# Patient Record
Sex: MALE | Race: WHITE | NOT HISPANIC OR LATINO | URBAN - METROPOLITAN AREA
[De-identification: names, ages, dates, MRNs, and addresses within clinical notes are randomized per-mention and may not be internally consistent; named-entity substitution may affect disease eponyms.]

---

## 2019-11-14 ENCOUNTER — HOSPITAL ENCOUNTER (EMERGENCY)
Facility: HOSPITAL | Age: 39
End: 2019-11-14
Attending: EMERGENCY MEDICINE | Admitting: EMERGENCY MEDICINE

## 2019-11-14 ENCOUNTER — HOSPITAL ENCOUNTER (INPATIENT)
Facility: HOSPITAL | Age: 39
LOS: 5 days | Discharge: HOME | DRG: 885 | End: 2019-11-19
Attending: PSYCHIATRY & NEUROLOGY | Admitting: PSYCHIATRY & NEUROLOGY
Payer: COMMERCIAL

## 2019-11-14 VITALS
HEART RATE: 88 BPM | WEIGHT: 200 LBS | OXYGEN SATURATION: 95 % | RESPIRATION RATE: 16 BRPM | HEIGHT: 72 IN | BODY MASS INDEX: 27.09 KG/M2 | SYSTOLIC BLOOD PRESSURE: 132 MMHG | DIASTOLIC BLOOD PRESSURE: 69 MMHG

## 2019-11-14 DIAGNOSIS — F43.12 POST-TRAUMATIC STRESS DISORDER, CHRONIC: Chronic | ICD-10-CM

## 2019-11-14 DIAGNOSIS — F10.10 ALCOHOL ABUSE: ICD-10-CM

## 2019-11-14 DIAGNOSIS — F32.A DEPRESSION WITH SUICIDAL IDEATION: Primary | ICD-10-CM

## 2019-11-14 DIAGNOSIS — R45.851 DEPRESSION WITH SUICIDAL IDEATION: Primary | ICD-10-CM

## 2019-11-14 DIAGNOSIS — R45.850 HOMICIDAL IDEATIONS: ICD-10-CM

## 2019-11-14 DIAGNOSIS — F39 MOOD DISORDER (HCC): Primary | ICD-10-CM

## 2019-11-14 DIAGNOSIS — F10.94 ALCOHOL USE WITH ALCOHOL-INDUCED MOOD DISORDER (HCC): ICD-10-CM

## 2019-11-14 LAB
AMPHETAMINES SERPL QL SCN: NEGATIVE
BACTERIA UR QL AUTO: ABNORMAL /HPF
BARBITURATES UR QL: NEGATIVE
BENZODIAZ UR QL: NEGATIVE
BILIRUB UR QL STRIP: NEGATIVE
CLARITY UR: CLEAR
COCAINE UR QL: NEGATIVE
COLOR UR: ABNORMAL
ETHANOL EXG-MCNC: 0.08 MG/DL
GLUCOSE UR STRIP-MCNC: NEGATIVE MG/DL
HGB UR QL STRIP.AUTO: 25
KETONES UR STRIP-MCNC: NEGATIVE MG/DL
LEUKOCYTE ESTERASE UR QL STRIP: 25
METHADONE UR QL: NEGATIVE
MUCOUS THREADS UR QL AUTO: ABNORMAL
NITRITE UR QL STRIP: NEGATIVE
NON-SQ EPI CELLS URNS QL MICRO: ABNORMAL /HPF
OPIATES UR QL SCN: NEGATIVE
PCP UR QL: NEGATIVE
PH UR STRIP.AUTO: 5 [PH]
PROT UR STRIP-MCNC: ABNORMAL MG/DL
RBC #/AREA URNS AUTO: ABNORMAL /HPF
SP GR UR STRIP.AUTO: 1.02 (ref 1–1.04)
THC UR QL: NEGATIVE
UROBILINOGEN UA: NEGATIVE MG/DL
WBC #/AREA URNS AUTO: ABNORMAL /HPF

## 2019-11-14 PROCEDURE — 82075 ASSAY OF BREATH ETHANOL: CPT

## 2019-11-14 PROCEDURE — 93005 ELECTROCARDIOGRAM TRACING: CPT

## 2019-11-14 PROCEDURE — 99285 EMERGENCY DEPT VISIT HI MDM: CPT

## 2019-11-14 PROCEDURE — 99285 EMERGENCY DEPT VISIT HI MDM: CPT | Performed by: EMERGENCY MEDICINE

## 2019-11-14 PROCEDURE — 81001 URINALYSIS AUTO W/SCOPE: CPT | Performed by: NURSE PRACTITIONER

## 2019-11-14 PROCEDURE — 80307 DRUG TEST PRSMV CHEM ANLYZR: CPT | Performed by: EMERGENCY MEDICINE

## 2019-11-14 PROCEDURE — 96372 THER/PROPH/DIAG INJ SC/IM: CPT

## 2019-11-14 RX ORDER — OLANZAPINE 10 MG/1
10 TABLET ORAL EVERY 6 HOURS PRN
Status: DISCONTINUED | OUTPATIENT
Start: 2019-11-14 | End: 2019-11-19 | Stop reason: HOSPADM

## 2019-11-14 RX ORDER — ACETAMINOPHEN 325 MG/1
650 TABLET ORAL EVERY 4 HOURS PRN
Status: CANCELLED | OUTPATIENT
Start: 2019-11-14

## 2019-11-14 RX ORDER — HALOPERIDOL 5 MG
5 TABLET ORAL EVERY 6 HOURS PRN
Status: DISCONTINUED | OUTPATIENT
Start: 2019-11-14 | End: 2019-11-19 | Stop reason: HOSPADM

## 2019-11-14 RX ORDER — HYDROXYZINE HYDROCHLORIDE 25 MG/1
25 TABLET, FILM COATED ORAL EVERY 6 HOURS PRN
Status: DISCONTINUED | OUTPATIENT
Start: 2019-11-14 | End: 2019-11-19 | Stop reason: HOSPADM

## 2019-11-14 RX ORDER — LORAZEPAM 2 MG/ML
1 INJECTION INTRAMUSCULAR EVERY 6 HOURS PRN
Status: DISCONTINUED | OUTPATIENT
Start: 2019-11-14 | End: 2019-11-19 | Stop reason: HOSPADM

## 2019-11-14 RX ORDER — QUETIAPINE FUMARATE 100 MG/1
100 TABLET, FILM COATED ORAL
Status: ON HOLD | COMMUNITY
End: 2019-11-19 | Stop reason: SDUPTHER

## 2019-11-14 RX ORDER — HALOPERIDOL 5 MG/ML
5 INJECTION INTRAMUSCULAR EVERY 6 HOURS PRN
Status: DISCONTINUED | OUTPATIENT
Start: 2019-11-14 | End: 2019-11-19 | Stop reason: HOSPADM

## 2019-11-14 RX ORDER — LORAZEPAM 0.5 MG/1
1 TABLET ORAL EVERY 6 HOURS PRN
Status: CANCELLED | OUTPATIENT
Start: 2019-11-14

## 2019-11-14 RX ORDER — RISPERIDONE 1 MG/1
2 TABLET, ORALLY DISINTEGRATING ORAL
Status: DISCONTINUED | OUTPATIENT
Start: 2019-11-14 | End: 2019-11-19 | Stop reason: HOSPADM

## 2019-11-14 RX ORDER — SERTRALINE HYDROCHLORIDE 100 MG/1
200 TABLET, FILM COATED ORAL DAILY
COMMUNITY
End: 2019-11-19 | Stop reason: HOSPADM

## 2019-11-14 RX ORDER — OLANZAPINE 10 MG/1
10 TABLET ORAL EVERY 6 HOURS PRN
Status: CANCELLED | OUTPATIENT
Start: 2019-11-14

## 2019-11-14 RX ORDER — ACETAMINOPHEN 325 MG/1
975 TABLET ORAL EVERY 6 HOURS PRN
Status: DISCONTINUED | OUTPATIENT
Start: 2019-11-14 | End: 2019-11-19 | Stop reason: HOSPADM

## 2019-11-14 RX ORDER — BENZTROPINE MESYLATE 1 MG/ML
1 INJECTION INTRAMUSCULAR; INTRAVENOUS EVERY 6 HOURS PRN
Status: CANCELLED | OUTPATIENT
Start: 2019-11-14

## 2019-11-14 RX ORDER — BENZTROPINE MESYLATE 1 MG/ML
1 INJECTION INTRAMUSCULAR; INTRAVENOUS EVERY 6 HOURS PRN
Status: DISCONTINUED | OUTPATIENT
Start: 2019-11-14 | End: 2019-11-19 | Stop reason: HOSPADM

## 2019-11-14 RX ORDER — ACETAMINOPHEN 325 MG/1
975 TABLET ORAL EVERY 6 HOURS PRN
Status: CANCELLED | OUTPATIENT
Start: 2019-11-14

## 2019-11-14 RX ORDER — BENZTROPINE MESYLATE 1 MG/1
1 TABLET ORAL EVERY 6 HOURS PRN
Status: DISCONTINUED | OUTPATIENT
Start: 2019-11-14 | End: 2019-11-19 | Stop reason: HOSPADM

## 2019-11-14 RX ORDER — HALOPERIDOL 5 MG/ML
10 INJECTION INTRAMUSCULAR ONCE
Status: COMPLETED | OUTPATIENT
Start: 2019-11-14 | End: 2019-11-14

## 2019-11-14 RX ORDER — ACETAMINOPHEN 325 MG/1
650 TABLET ORAL EVERY 6 HOURS PRN
Status: DISCONTINUED | OUTPATIENT
Start: 2019-11-14 | End: 2019-11-15

## 2019-11-14 RX ORDER — HALOPERIDOL 5 MG
5 TABLET ORAL EVERY 6 HOURS PRN
Status: CANCELLED | OUTPATIENT
Start: 2019-11-14

## 2019-11-14 RX ORDER — RISPERIDONE 1 MG/1
2 TABLET, ORALLY DISINTEGRATING ORAL
Status: CANCELLED | OUTPATIENT
Start: 2019-11-14

## 2019-11-14 RX ORDER — OLANZAPINE 10 MG/1
10 INJECTION, POWDER, LYOPHILIZED, FOR SOLUTION INTRAMUSCULAR EVERY 6 HOURS PRN
Status: CANCELLED | OUTPATIENT
Start: 2019-11-14

## 2019-11-14 RX ORDER — ACETAMINOPHEN 325 MG/1
650 TABLET ORAL EVERY 4 HOURS PRN
Status: DISCONTINUED | OUTPATIENT
Start: 2019-11-14 | End: 2019-11-15

## 2019-11-14 RX ORDER — MAGNESIUM HYDROXIDE/ALUMINUM HYDROXICE/SIMETHICONE 120; 1200; 1200 MG/30ML; MG/30ML; MG/30ML
30 SUSPENSION ORAL EVERY 4 HOURS PRN
Status: CANCELLED | OUTPATIENT
Start: 2019-11-14

## 2019-11-14 RX ORDER — HALOPERIDOL 5 MG/ML
5 INJECTION INTRAMUSCULAR EVERY 6 HOURS PRN
Status: CANCELLED | OUTPATIENT
Start: 2019-11-14

## 2019-11-14 RX ORDER — MAGNESIUM HYDROXIDE/ALUMINUM HYDROXICE/SIMETHICONE 120; 1200; 1200 MG/30ML; MG/30ML; MG/30ML
30 SUSPENSION ORAL EVERY 4 HOURS PRN
Status: DISCONTINUED | OUTPATIENT
Start: 2019-11-14 | End: 2019-11-19 | Stop reason: HOSPADM

## 2019-11-14 RX ORDER — ACETAMINOPHEN 325 MG/1
650 TABLET ORAL EVERY 6 HOURS PRN
Status: CANCELLED | OUTPATIENT
Start: 2019-11-14

## 2019-11-14 RX ORDER — OLANZAPINE 10 MG/1
10 INJECTION, POWDER, LYOPHILIZED, FOR SOLUTION INTRAMUSCULAR EVERY 6 HOURS PRN
Status: DISCONTINUED | OUTPATIENT
Start: 2019-11-14 | End: 2019-11-19 | Stop reason: HOSPADM

## 2019-11-14 RX ORDER — BENZTROPINE MESYLATE 1 MG/1
1 TABLET ORAL EVERY 6 HOURS PRN
Status: CANCELLED | OUTPATIENT
Start: 2019-11-14

## 2019-11-14 RX ORDER — TRAZODONE HYDROCHLORIDE 50 MG/1
50 TABLET ORAL
Status: CANCELLED | OUTPATIENT
Start: 2019-11-14

## 2019-11-14 RX ORDER — HYDROXYZINE HYDROCHLORIDE 25 MG/1
25 TABLET, FILM COATED ORAL EVERY 6 HOURS PRN
Status: CANCELLED | OUTPATIENT
Start: 2019-11-14

## 2019-11-14 RX ORDER — TRAZODONE HYDROCHLORIDE 50 MG/1
50 TABLET ORAL
Status: DISCONTINUED | OUTPATIENT
Start: 2019-11-14 | End: 2019-11-19 | Stop reason: HOSPADM

## 2019-11-14 RX ORDER — LORAZEPAM 2 MG/ML
1 INJECTION INTRAMUSCULAR EVERY 6 HOURS PRN
Status: CANCELLED | OUTPATIENT
Start: 2019-11-14

## 2019-11-14 RX ORDER — HALOPERIDOL 5 MG/ML
10 INJECTION INTRAMUSCULAR ONCE
Status: DISCONTINUED | OUTPATIENT
Start: 2019-11-14 | End: 2019-11-14

## 2019-11-14 RX ORDER — LORAZEPAM 1 MG/1
1 TABLET ORAL EVERY 6 HOURS PRN
Status: DISCONTINUED | OUTPATIENT
Start: 2019-11-14 | End: 2019-11-19 | Stop reason: HOSPADM

## 2019-11-14 RX ORDER — LORAZEPAM 2 MG/ML
2 INJECTION INTRAMUSCULAR ONCE
Status: COMPLETED | OUTPATIENT
Start: 2019-11-14 | End: 2019-11-14

## 2019-11-14 RX ADMIN — LORAZEPAM 2 MG: 2 INJECTION INTRAMUSCULAR; INTRAVENOUS at 03:45

## 2019-11-14 RX ADMIN — TRAZODONE HYDROCHLORIDE 50 MG: 50 TABLET ORAL at 21:50

## 2019-11-14 RX ADMIN — HALOPERIDOL LACTATE 10 MG: 5 INJECTION, SOLUTION INTRAMUSCULAR at 03:45

## 2019-11-14 RX ADMIN — NICOTINE POLACRILEX 2 MG: 2 GUM, CHEWING BUCCAL at 14:01

## 2019-11-14 NOTE — ED NOTES
Pts brother on phone  Pt does not want to talk but is okay with us giving the brother information about him being here and him being transferred        Munira Marcus  11/14/19 6080

## 2019-11-14 NOTE — PROGRESS NOTES
Pt admitted from [facility] ED as 201 to 3B  Pt states "I went [place] the casino, drank way too much, started having thoughts of harming myself, I thought about jumping off a bridge and called the police to prevent anything bad " Report confirms this, with the addition that pt was acting bizarre in casino, kicked EMS worker, and made numerous threats to ED staff warranting restraints to be applied  Was cooperative after sobering up  Presents as cooperative, constricted, flat and guarded  Content is scant and concrete  Pt denies SI, HI and hallucinations currently  Cousin Víctor Hicks is legal power of  and given access to pt's medical information (paperwork faxed to 3B)  Reported to nurse "He knows what to say to get a warm bed in the hospital since he was probably freezing outside and drunk  But he wasn't suicidal " Pt consents to sharing all info with Víctor Hicks  Pt has hx of drug use 5yrs ago as well as alcohol abuse hx; however was obtaining from alcohol for awhile before this event  Also has hx for PTSD, was  , and had previous hospitalizations for psych in [address] [place] [address] for similar events  Currently contracts for safety

## 2019-11-14 NOTE — ED ATTENDING ATTESTATION
11/14/2019  I, Gio Adan MD, saw and evaluated the patient  I have discussed the patient with the resident and agree with the resident's findings, Plan of Care, and MDM as documented in the resident's note, unless otherwise documented below  All available labs and Radiology studies were reviewed by myself  I was present for key portions of any procedure(s) performed by the resident and I was immediately available to provide assistance  I agree with the current assessment done in the Emergency Department  I have conducted an independent evaluation of this patient  Briefly, this is a 46 y o  male presenting with past medical history of depression and PTSD presenting to the emergency department via police department with suicidal and homicidal ideation  Patient is a former [employer] with history of PTSD and depression  This evening, he drank alcohol after getting worsening low moods  He had a plan to either kill himself by jumping off a bridge or killing someone else instilling nerve vehicle in killing himself  He asked a housemate to call the police, when police arrived, they called EMS as well  Patient was extremely combative necessitating restraints  On arrival, patient is awake, restrained by numerous police officers and EMS personnel, yelling profanities and threatening to kill the police officers  He is not redirectable  Following administration of Ativan and Haldol, patient is much more calm, reports worsening low moods and suicidal ideation  He drank alcohol this evening because he is depressed  He denies ingesting any other substances  He does not have any chest pain or shortness of breath  There is no abdominal pain, nausea, or vomiting  He does report a wound to his left arm after a fall, reports he has been applying a Band-Aid to the area      Physical Exam  Vitals:    11/14/19 0351 11/14/19 0633   BP: 148/85 109/59   Pulse: 105 95   Resp: 20 20   Patient Position - Orthostatic VS: Lying     Constitutional:  Awake, alert, screaming obscenities and not safe for examination  The rest of the examination is performed after patient received Ativan and Haldol  HEENT:  Normocephalic, atraumatic  Sclera anicteric, conjunctiva are injected  Moist oral mucosa  Cardiac:  Mildly tachycardic, regular, no murmurs or rubs  2+ radial pulses  Respiratory:  Lungs are clear to auscultation bilaterally, no wheezes or rales  Abdomen:  Nondistended  Bowel sounds present  No tenderness to palpation  No rebound or guarding  Extremities:  No deformities, no edema  There is a superficial wound to left upper arm dressed with a Band-Aid  There is no evidence of surrounding erythema  Integument:  No rashes or exposed areas, cap refill less than 2 seconds  Neurologic:  Awake, alert, and oriented x3  Nonfocal exam   Psychiatric:  Initially combative and verbally abusive, impulsive, shouting he would either kill himself or kill the first person he would come across  Following chemical restraint medications, patient is redirectable and polite, reports he would like to sign in voluntarily to get help for his worsening mood and suicidal ideation  Does not appear to attend to internal stimuli  ED Course  Medications   haloperidol lactate (HALDOL) injection 10 mg (10 mg Intramuscular Given 11/14/19 0345)   LORazepam (ATIVAN) 2 mg/mL injection 2 mg (2 mg Intramuscular Given 11/14/19 0345)       56-year-old  presenting with suicidal ideation and possibly homicidal ideation, currently with alcohol intoxication  Patient is initially extremely combative and verbally abusive, necessitating numerous members of staff to physically restrain the patient  Given significant concern for the well being of the patient and will being of those around him, chemical restraints were deemed necessary, Ativan 2 mg IM and Haldol 10 mg IM administered and patient placed in wrist and ankle restraints    On re-evaluation, patient is awake and alert, he is calm and cooperative  We are able to obtain history of present illness and performed a physical exam   Breathalyzer ETOH 0 084  On re-evaluation, patient is sleeping but arousable  He reports he is done being violent  We removed the restraints  Patient provided urine sample  UDS negative  On re-evaluation, patient is sleeping, wakes up appropriately, continues to be calm and cooperative  Patient care signed out to day team with plan for patient to be evaluated by ED Crisis Worker, to sign 201 form, and for patient to be admitted for further psychiatric evaluation and care        Clinical Impression  Final diagnoses:   Alcohol abuse   Depression with suicidal ideation   Homicidal ideations

## 2019-11-14 NOTE — ED NOTES
Pt comes to the ed from [facility] after drinking for the first time in two years  Pt states he became depressed and asked security to call for an ambulance  While in the ed pt made threats to jump off a bridge and kill several other people including police officers  Pt was placed in restraints  Pt is currently out of restraints and sober  Pt still feels depressed but not currently suicidal or having thoughts of hurting anyone else  Pt denies any hallucinations  Pt sees a psychiatrist in [facility] and claims to be med compliant  Has hx of PTSD and depression   Pt agrees he needs help and wishes to sign a 201 for inpatient tx

## 2019-11-14 NOTE — ED NOTES
[employer] Grey laptop  Portfolio (Riosisabel Mac leather)  [employer] notebook  Black umbrella   [place] hat  Black laptop   Black LG cell phone  [employer] Body spray  [employer] badge  Black wallet  [employer] watch  Black sunglasses  Green/black head phones  White bandana   [place] Cigarettes  Yellow lighter  Matches  Visine   base (white)  Water bottle  Dog tag  State ID  Veterans ID  [employer] Id  One [place]  11/14/19 8255

## 2019-11-14 NOTE — ED NOTES
Pt ambulated to the bathroom at 0648 and gave a urine sample  Patient was given a blanket and a pillow and is now sleeping again       [email or web address]  04/41/09 8751

## 2019-11-14 NOTE — LETTER
[address] [facility] EMERGENCY DEPARTMENT  [facility] [address]  Dept: [phone]      [facility]    NAME Mylene LUA 1980                              MRN [id number]    I have been informed of my rights regarding examination, treatment, and transfer   by Dr Noreen Dominguez MD    Benefits: Continuity of care    Risks: Potential for delay in receiving treatment      Consent for Transfer:  I acknowledge that my medical condition has been evaluated and explained to me by the emergency department physician or other qualified medical person and/or my attending physician, who has recommended that I be transferred to the service of  Accepting Physician: Zuleika at [address] Name, [address] : [facility]  The above potential benefits of such transfer, the potential risks associated with such transfer, and the probable risks of not being transferred have been explained to me, and I fully understand them  The doctor has explained that, in my case, the benefits of transfer outweigh the risks  I agree to be transferred  I authorize the performance of emergency medical procedures and treatments upon me in both transit and upon arrival at the receiving facility  Additionally, I authorize the release of any and all medical records to the receiving facility and request they be transported with me, if possible  I understand that the safest mode of transportation during a medical emergency is an ambulance and that the Hospital advocates the use of this mode of transport  Risks of traveling to the receiving facility by car, including absence of medical control, life sustaining equipment, such as oxygen, and medical personnel has been explained to me and I fully understand them  (LISSETTE CORRECT BOX BELOW)  [  ]  I consent to the stated transfer and to be transported by ambulance/helicopter    [  ]  I consent to the stated transfer, but refuse transportation by ambulance and accept full responsibility for my transportation by car  I understand the risks of non-ambulance transfers and I exonerate the Hospital and its staff from any deterioration in my condition that results from this refusal     X___________________________________________    DATE  19  TIME________  Signature of patient or legally responsible individual signing on patient behalf           RELATIONSHIP TO PATIENT_________________________                  Provider Certification    NAME Raudel Burgos                                         1980                              MRN [id number]    A medical screening exam was performed on the above named patient  Based on the examination:    Condition Necessitating Transfer The encounter diagnosis was Alcohol abuse  Patient Condition: The patient has been stabilized such that within reasonable medical probability, no material deterioration of the patient condition or the condition of the unborn child(rodney) is likely to result from the transfer    Reason for Transfer: Level of Care needed not available at this facility    Transfer Requirements: [address]   · Space available and qualified personnel available for treatment as acknowledged by    · Agreed to accept transfer and to provide appropriate medical treatment as acknowledged by       [facility]  · Appropriate medical records of the examination and treatment of the patient are provided at the time of transfer   [address], [address] _______  · Transfer will be performed by qualified personnel from [facility]  and appropriate transfer equipment as required, including the use of necessary and appropriate life support measures      Provider Certification: I have examined the patient and explained the following risks and benefits of being transferred/refusing transfer to the patient/family:  General risk, such as traffic hazards, adverse weather conditions, rough terrain or turbulence, possible failure of equipment (including vehicle or aircraft), or consequences of actions of persons outside the control of the transport personnel      Based on these reasonable risks and benefits to the patient and/or the unborn child(rodney), and based upon the information available at the time of the patients examination, I certify that the medical benefits reasonably to be expected from the provision of appropriate medical treatments at another medical facility outweigh the increasing risks, if any, to the individuals medical condition, and in the case of labor to the unborn child, from effecting the transfer      X____________________________________________ DATE 11/14/19        TIME_______      ORIGINAL - SEND TO MEDICAL RECORDS   COPY - SEND WITH PATIENT DURING TRANSFER

## 2019-11-14 NOTE — PLAN OF CARE
Problem: Risk for Self Injury/Neglect  Goal: Treatment Goal: Remain safe during length of stay, learn and adopt new coping skills, and be free of self-injurious ideation, impulses and acts at the time of discharge  Outcome: Not Progressing  Goal: Verbalize thoughts and feelings  Description  Interventions:  - Assess and re-assess patient's lethality and potential for self-injury  - Engage patient in 1:1 interactions, daily, for a minimum of 15 minutes  - Encourage patient to express feelings, fears, frustrations, hopes  - Establish rapport/trust with patient   Outcome: Not Progressing  Goal: Attend and participate in unit activities, including therapeutic, recreational, and educational groups  Description  Interventions:  - Provide therapeutic and educational activities daily, encourage attendance and participation, and document same in the medical record  - Obtain collateral information, encourage visitation and family involvement in care   Outcome: Not Progressing     Problem: SUBSTANCE USE/ABUSE  Goal: By discharge, will develop insight into their chemical dependency and sustain motivation to continue in recovery  Description  INTERVENTIONS:  - Attends all daily group sessions and scheduled AA groups  - Actively practices coping skills through participation in the therapeutic community and adherence to program rules  - Reviews and completes assignments from individual treatment plan  - Assist patient development of understanding of their personal cycle of addiction and relapse triggers  Outcome: Not Progressing  Goal: By discharge, patient will have ongoing treatment plan addressing chemical dependency  Description  INTERVENTIONS:  - Assist patient with resources and/or appointments for ongoing recovery based living  Outcome: Not Progressing     Problem: SAFETY, RESTRAINT - VIOLENT/SELF-DESTRUCTIVE  Goal: Returns to optimal restraint-free functioning  Description  INTERVENTIONS:  - Assess the patient's behavior and symptoms that indicate continued need for restraint  - Identify and implement measures to help patient regain control  - Assess readiness for release of restraint   Outcome: Not Progressing

## 2019-11-14 NOTE — ED NOTES
Lunch tray ordered for pt at this time  Pt resting comfortably in bed, denies any complaints        Munira Marcus  11/14/19 0351

## 2019-11-14 NOTE — ED NOTES
Patient is accepted at [facility]  Patient is accepted by TEREZA To per [address] is arranged with [place] Pamela is scheduled for 1500     Nurse report is to be called to [phone] prior to patient transfer

## 2019-11-14 NOTE — ED NOTES
Paper charted from [phone] [facility] was giving me "error loading" messages      Ry Campuzano  44/58/39 0996

## 2019-11-14 NOTE — ED NOTES
Pt gene is located in zone 6 [facility] room in cabinet LYNN Venegas [facility]  75/76/50 5438

## 2019-11-14 NOTE — ED NOTES
As per EVS pt is not on file   Pt states he doesn't know if he has insurance and there is nothing in media

## 2019-11-14 NOTE — ED PROVIDER NOTES
History  Chief Complaint   Patient presents with    Aggressive Behavior     Patient coming in with police and EMS; verbally aggressive and threatening to kick ED staff and EMS; found at [facility] yelling that he is going to either kill himself or someone else        History provided by:  EMS personnel and police   used: No      Patient is a 23-year-old male with past medical history of depression and PTSD who presents to the emergency department with suicidal and homicidal ideations  The patient states that he has been drinking alcohol this evening, and that he was either going to jump off of a bridge or he was going to kill someone and steal their vehicle  He told someone to call police and police subsequently came and evaluated the patient, EMS was called as well  Patient was combative, had to be physically restrained secondary to aggressive behavior  Patient has been yelling racial slurs and threatening to harm police officers EMS  On arrival he is yelling profanities, verbally abusive and aggressive  Patient restrained for safety of self and for safety of medical staff  Patient denies prior suicide attempts, he denies ingestions of any illicit drugs, over-the-counter medications, or prescription medications  He denies any physical complaints including chest pain, shortness of breath, cough, abdominal pain, nausea, vomiting, urinary symptoms, melena, hematochezia, headache, neck pain  Prior to Admission Medications   Prescriptions Last Dose Informant Patient Reported? Taking? QUEtiapine (SEROquel) 100 mg tablet Past Week at Unknown time  Yes Yes   Sig: Take 100 mg by mouth daily at bedtime   sertraline (ZOLOFT) 100 mg tablet 11/13/2019 at Unknown time  Yes Yes   Sig: Take 200 mg by mouth daily      Facility-Administered Medications: None       Past Medical History:   Diagnosis Date    Depression     PTSD (post-traumatic stress disorder)        History reviewed   No pertinent surgical history  History reviewed  No pertinent family history  I have reviewed and agree with the history as documented  Social History     Tobacco Use    Smoking status: Heavy Tobacco Smoker     Packs/day: 0 50     Types: Cigarettes    Smokeless tobacco: Never Used   Substance Use Topics    Alcohol use: Yes     Frequency: Monthly or less     Drinks per session: 1 or 2     Binge frequency: Never    Drug use: Never        Review of Systems   Unable to perform ROS: Psychiatric disorder   Psychiatric/Behavioral: Positive for agitation and suicidal ideas  Physical Exam  ED Triage Vitals   Temp Pulse Respirations Blood Pressure SpO2   -- 11/14/19 0351 11/14/19 0351 11/14/19 0351 11/14/19 0351    105 20 148/85 97 %      Temp src Heart Rate Source Patient Position - Orthostatic VS BP Location FiO2 (%)   -- 11/14/19 0351 11/14/19 0633 11/14/19 0633 --    Monitor Lying Left arm       Pain Score       11/14/19 1240       No Pain             Orthostatic Vital Signs  Vitals:    11/14/19 0351 11/14/19 0633 11/14/19 1240 11/14/19 1552   BP: 148/85 109/59 113/57 132/69   Pulse: 105 95 73 88   Patient Position - Orthostatic VS:  Lying Lying Lying       Physical Exam   Constitutional: He is oriented to person, place, and time  He appears well-developed and well-nourished  HENT:   Head: Normocephalic and atraumatic  Right Ear: External ear normal    Left Ear: External ear normal    Nose: Nose normal    Mouth/Throat: Oropharynx is clear and moist    Eyes: Conjunctivae and EOM are normal  No scleral icterus  Neck: Normal range of motion  No JVD present  No tracheal deviation present  Cardiovascular: Normal rate, regular rhythm, normal heart sounds and intact distal pulses  No murmur heard  Tachycardic   Pulmonary/Chest: Effort normal and breath sounds normal  No respiratory distress  Abdominal: Soft  Bowel sounds are normal  He exhibits no distension  There is no tenderness  There is no guarding  Musculoskeletal: Normal range of motion  He exhibits no edema  Neurological: He is alert and oriented to person, place, and time  He exhibits normal muscle tone  Skin: Skin is warm and dry  Capillary refill takes less than 2 seconds  He is not diaphoretic  Psychiatric: His speech is normal  His affect is angry and inappropriate  He is agitated, aggressive and combative  Cognition and memory are normal  He expresses impulsivity and inappropriate judgment  He expresses homicidal and suicidal ideation  He expresses suicidal plans and homicidal plans  Vitals reviewed  ED Medications  Medications   haloperidol lactate (HALDOL) injection 10 mg (10 mg Intramuscular Given 11/14/19 0345)   LORazepam (ATIVAN) 2 mg/mL injection 2 mg (2 mg Intramuscular Given 11/14/19 0345)   nicotine polacrilex (NICORETTE) gum 2 mg (2 mg Oral Given 11/14/19 1401)       Diagnostic Studies  Results Reviewed     Procedure Component Value Units Date/Time    POCT alcohol breath test [[id number]]  (Normal) Resulted:  11/14/19 0415    Lab Status:  Final result Updated:  11/14/19 0900     EXTBreath Alcohol 0 084    Rapid drug screen, urine [[id number]]  (Normal) Collected:  11/14/19 0648    Lab Status:  Final result Specimen:  Urine, Clean Catch Updated:  11/14/19 0732     Amph/Meth UR Negative     Barbiturate Ur Negative     Benzodiazepine Urine Negative     Cocaine Urine Negative     Methadone Urine Negative     Opiate Urine Negative     PCP Ur Negative     THC Urine Negative    Narrative:       FOR MEDICAL PURPOSES ONLY  IF CONFIRMATION NEEDED PLEASE CONTACT THE LAB WITHIN 5 DAYS      Drug Screen Cutoff Levels:  AMPHETAMINE/METHAMPHETAMINES  1000 ng/mL  BARBITURATES     200 ng/mL  BENZODIAZEPINES     200 ng/mL  COCAINE      300 ng/mL  METHADONE      300 ng/mL  OPIATES      300 ng/mL  PHENCYCLIDINE     25 ng/mL  THC       50 ng/mL                   No orders to display         Procedures  Procedures        ED Course MDM  Number of Diagnoses or Management Options  Diagnosis management comments: Patient with suicidal and homicidal ideations, with a plan  Patient refuses to sign 201, 302 to signed  Pending placement  Patient signed out to Dr Golden Hale          Amount and/or Complexity of Data Reviewed  Clinical lab tests: ordered and reviewed  Decide to obtain previous medical records or to obtain history from someone other than the patient: yes  Obtain history from someone other than the patient: yes        Disposition  Final diagnoses:   Alcohol abuse     Time reflects when diagnosis was documented in both MDM as applicable and the Disposition within this note     Time User Action Codes Description Comment    11/14/2019 11:45 AM Nicolasa Champagne 92 Add [F10 10] Alcohol abuse       ED Disposition     ED Disposition Condition Date/Time Comment    Transfer to Another Facility  Thu Nov 14, 2019  4:42 PM George Au should be transferred out to [facility] as per PAUL FERNANDEZ Documentation      Most Recent Value   Patient Condition  The patient has been stabilized such that within reasonable medical probability, no material deterioration of the patient condition or the condition of the unborn child(rodney) is likely to result from the transfer   Reason for Transfer  Level of Care needed not available at this facility   Benefits of Transfer  Continuity of care   Risks of Transfer  Potential for delay in receiving treatment   Accepting Physician  [facility] Name, Maira Nicholas by [place] and Unit #)  Candie Carvajal   Provider Certification  General risk, such as traffic hazards, adverse weather conditions, rough terrain or turbulence, possible failure of equipment (including vehicle or aircraft), or consequences of actions of persons outside the control of the transport personnel      RN Documentation      Most [address] [place] [address] Name, [address]   Report Given to  [employer]   Transported by [place] and Unit #)  [place]   Level of Care  Basic life support   Patient Belongings Disposition  Sent with patient      Follow-up Information    None         Discharge Medication List as of 11/14/2019  4:42 PM      CONTINUE these medications which have NOT CHANGED    Details   QUEtiapine (SEROquel) 100 mg tablet Take 100 mg by mouth daily at bedtime, Historical Med      sertraline (ZOLOFT) 100 mg tablet Take 200 mg by mouth daily, Historical Med           No discharge procedures on file  ED Provider  Attending physically available and evaluated Mathew Ana MOSES managed the patient along with the ED Attending      Electronically Signed by         Linette Jackson DO  11/14/19 5573

## 2019-11-14 NOTE — ED NOTES
Hard restraints removed at  by MD Klaus Ramsey and RENEA Mckenzie  60/94/73 Dawood Downey  42/61/56 8188

## 2019-11-15 ENCOUNTER — APPOINTMENT (INPATIENT)
Dept: ULTRASOUND IMAGING | Facility: HOSPITAL | Age: 39
DRG: 885 | End: 2019-11-15
Payer: COMMERCIAL

## 2019-11-15 PROBLEM — F33.2 SEVERE EPISODE OF RECURRENT MAJOR DEPRESSIVE DISORDER, WITHOUT PSYCHOTIC FEATURES (HCC): Status: ACTIVE | Noted: 2019-11-15

## 2019-11-15 PROBLEM — F33.2 MAJOR DEPRESSIVE DISORDER, RECURRENT, SEVERE WITHOUT PSYCHOTIC FEATURES (HCC): Chronic | Status: ACTIVE | Noted: 2019-11-15

## 2019-11-15 PROBLEM — E78.2 MIXED HYPERLIPIDEMIA: Status: ACTIVE | Noted: 2019-11-15

## 2019-11-15 PROBLEM — F43.12 POST-TRAUMATIC STRESS DISORDER, CHRONIC: Chronic | Status: ACTIVE | Noted: 2019-11-15

## 2019-11-15 PROBLEM — Z00.8 MEDICAL CLEARANCE FOR PSYCHIATRIC ADMISSION: Status: ACTIVE | Noted: 2019-11-15

## 2019-11-15 PROBLEM — Z72.0 TOBACCO ABUSE: Status: ACTIVE | Noted: 2019-11-15

## 2019-11-15 PROBLEM — R74.01 TRANSAMINITIS: Status: ACTIVE | Noted: 2019-11-15

## 2019-11-15 PROBLEM — F10.20 UNCOMPLICATED ALCOHOL DEPENDENCE (HCC): Chronic | Status: ACTIVE | Noted: 2019-11-15

## 2019-11-15 LAB
ALBUMIN SERPL BCP-MCNC: 4.3 G/DL (ref 3–5.2)
ALP SERPL-CCNC: 75 U/L (ref 43–122)
ALT SERPL W P-5'-P-CCNC: 95 U/L (ref 9–52)
ANION GAP SERPL CALCULATED.3IONS-SCNC: 7 MMOL/L (ref 5–14)
AST SERPL W P-5'-P-CCNC: 86 U/L (ref 17–59)
ATRIAL RATE: 83 BPM
BASOPHILS # BLD AUTO: 0 THOUSANDS/ΜL (ref 0–0.1)
BASOPHILS NFR BLD AUTO: 1 % (ref 0–1)
BILIRUB SERPL-MCNC: 0.7 MG/DL
BUN SERPL-MCNC: 13 MG/DL (ref 5–25)
CALCIUM SERPL-MCNC: 9.4 MG/DL (ref 8.4–10.2)
CHLORIDE SERPL-SCNC: 103 MMOL/L (ref 97–108)
CHOLEST SERPL-MCNC: 193 MG/DL
CO2 SERPL-SCNC: 28 MMOL/L (ref 22–30)
CREAT SERPL-MCNC: 0.84 MG/DL (ref 0.7–1.5)
EOSINOPHIL # BLD AUTO: 0.1 THOUSAND/ΜL (ref 0–0.4)
EOSINOPHIL NFR BLD AUTO: 2 % (ref 0–6)
ERYTHROCYTE [DISTWIDTH] IN BLOOD BY AUTOMATED COUNT: 13.4 %
GFR SERPL CREATININE-BSD FRML MDRD: 111 ML/MIN/1.73SQ M
GLUCOSE P FAST SERPL-MCNC: 96 MG/DL (ref 70–99)
GLUCOSE SERPL-MCNC: 96 MG/DL (ref 70–99)
HCT VFR BLD AUTO: 44.7 % (ref 41–53)
HDLC SERPL-MCNC: 36 MG/DL
HGB BLD-MCNC: 15 G/DL (ref 13.5–17.5)
LDLC SERPL CALC-MCNC: 136 MG/DL
LYMPHOCYTES # BLD AUTO: 2.1 THOUSANDS/ΜL (ref 0.5–4)
LYMPHOCYTES NFR BLD AUTO: 32 % (ref 25–45)
MCH RBC QN AUTO: 28.4 PG (ref 26–34)
MCHC RBC AUTO-ENTMCNC: 33.6 G/DL (ref 31–36)
MCV RBC AUTO: 84 FL (ref 80–100)
MONOCYTES # BLD AUTO: 0.6 THOUSAND/ΜL (ref 0.2–0.9)
MONOCYTES NFR BLD AUTO: 10 % (ref 1–10)
NEUTROPHILS # BLD AUTO: 3.6 THOUSANDS/ΜL (ref 1.8–7.8)
NEUTS SEG NFR BLD AUTO: 56 % (ref 45–65)
NONHDLC SERPL-MCNC: 157 MG/DL
P AXIS: 60 DEGREES
PLATELET # BLD AUTO: 326 THOUSANDS/UL (ref 150–450)
PMV BLD AUTO: 8.4 FL (ref 8.9–12.7)
POTASSIUM SERPL-SCNC: 4.3 MMOL/L (ref 3.6–5)
PR INTERVAL: 126 MS
PROT SERPL-MCNC: 8.3 G/DL (ref 5.9–8.4)
QRS AXIS: 67 DEGREES
QRSD INTERVAL: 88 MS
QT INTERVAL: 372 MS
QTC INTERVAL: 437 MS
RBC # BLD AUTO: 5.29 MILLION/UL (ref 4.5–5.9)
RPR SER QL: NORMAL
SODIUM SERPL-SCNC: 138 MMOL/L (ref 137–147)
T WAVE AXIS: 41 DEGREES
TRIGL SERPL-MCNC: 105 MG/DL
TSH SERPL DL<=0.05 MIU/L-ACNC: 0.96 UIU/ML (ref 0.47–4.68)
VENTRICULAR RATE: 83 BPM
WBC # BLD AUTO: 6.4 THOUSAND/UL (ref 4.5–11)

## 2019-11-15 PROCEDURE — 86592 SYPHILIS TEST NON-TREP QUAL: CPT | Performed by: NURSE PRACTITIONER

## 2019-11-15 PROCEDURE — 99253 IP/OBS CNSLTJ NEW/EST LOW 45: CPT | Performed by: FAMILY MEDICINE

## 2019-11-15 PROCEDURE — 76705 ECHO EXAM OF ABDOMEN: CPT

## 2019-11-15 PROCEDURE — 93010 ELECTROCARDIOGRAM REPORT: CPT | Performed by: INTERNAL MEDICINE

## 2019-11-15 PROCEDURE — 85025 COMPLETE CBC W/AUTO DIFF WBC: CPT | Performed by: NURSE PRACTITIONER

## 2019-11-15 PROCEDURE — 84443 ASSAY THYROID STIM HORMONE: CPT | Performed by: NURSE PRACTITIONER

## 2019-11-15 PROCEDURE — 82652 VIT D 1 25-DIHYDROXY: CPT | Performed by: NURSE PRACTITIONER

## 2019-11-15 PROCEDURE — 99222 1ST HOSP IP/OBS MODERATE 55: CPT | Performed by: PSYCHIATRY & NEUROLOGY

## 2019-11-15 PROCEDURE — 80053 COMPREHEN METABOLIC PANEL: CPT | Performed by: NURSE PRACTITIONER

## 2019-11-15 PROCEDURE — 80061 LIPID PANEL: CPT | Performed by: NURSE PRACTITIONER

## 2019-11-15 RX ORDER — QUETIAPINE FUMARATE 50 MG/1
50 TABLET, FILM COATED ORAL
Status: DISCONTINUED | OUTPATIENT
Start: 2019-11-15 | End: 2019-11-16

## 2019-11-15 RX ORDER — THIAMINE MONONITRATE (VIT B1) 100 MG
100 TABLET ORAL DAILY
Status: DISCONTINUED | OUTPATIENT
Start: 2019-11-16 | End: 2019-11-19 | Stop reason: HOSPADM

## 2019-11-15 RX ORDER — NICOTINE 21 MG/24HR
1 PATCH, TRANSDERMAL 24 HOURS TRANSDERMAL DAILY
Status: DISCONTINUED | OUTPATIENT
Start: 2019-11-15 | End: 2019-11-19 | Stop reason: HOSPADM

## 2019-11-15 RX ORDER — FOLIC ACID 1 MG/1
1 TABLET ORAL DAILY
Status: DISCONTINUED | OUTPATIENT
Start: 2019-11-16 | End: 2019-11-19 | Stop reason: HOSPADM

## 2019-11-15 RX ORDER — GABAPENTIN 300 MG/1
300 CAPSULE ORAL 3 TIMES DAILY
Status: DISCONTINUED | OUTPATIENT
Start: 2019-11-15 | End: 2019-11-17

## 2019-11-15 RX ORDER — GABAPENTIN 100 MG/1
100 CAPSULE ORAL 3 TIMES DAILY
Status: DISCONTINUED | OUTPATIENT
Start: 2019-11-19 | End: 2019-11-17

## 2019-11-15 RX ADMIN — QUETIAPINE FUMARATE 50 MG: 50 TABLET ORAL at 21:19

## 2019-11-15 RX ADMIN — NICOTINE 1 PATCH: 21 PATCH, EXTENDED RELEASE TRANSDERMAL at 10:54

## 2019-11-15 RX ADMIN — SERTRALINE HYDROCHLORIDE 200 MG: 50 TABLET ORAL at 10:59

## 2019-11-15 RX ADMIN — GABAPENTIN 300 MG: 300 CAPSULE ORAL at 21:19

## 2019-11-15 RX ADMIN — GABAPENTIN 300 MG: 300 CAPSULE ORAL at 14:28

## 2019-11-15 NOTE — ASSESSMENT & PLAN NOTE
Patient noted to have transaminitis  He denies any alcohol abuse to me however there is history of occasional alcohol use in his history  Will recheck CMP tomorrow and also check acute hepatitis panel and do an ultrasound of the liver to rule out any masses  Follow-up CMP in 4 weeks to be followed up by outpatient PCP    Avoid any hepatotoxic medications including Tylenol

## 2019-11-15 NOTE — CASE MANAGEMENT
Pt presented to [facility] accompanied by police and EMS as they were called by an unknown source  Pt had been drinking at the [facility] for the "first time in 2 years " Pt reported SI, HI and had stated that he had been binge drinking alcohol that evening and that he was either going to jump off a bridge or he was going to kill someone and steal their vehicle  Pt then told someone to call police and the police subsequently came and evaluated the patient along with EMS  Patient was combative, threatening, verbally and physically aggressive in the ED and required 4 point locked restraints  Pt had been yelling racial slurs and threatening to harm police officers, hospital staff, and EMS  Pt appeared to calm as he sobered from alcohol and wanted voluntary treatment as he is currently a 201  Pt states he has a psychiatrist in [place] at the [facility] named Dr Estefanía Velez and has a mental health and substance abuse history for ETOH and illicit drugs  Pt states he is a   of the [facility] [employer] for 6 years of service and has had previous psychiatric admissions for similar behaviors and issues in the past  Pt had signed an FAROOQ for his POA and cousin Víctor Hicks [phone]  Pt states there are no guns in the home where he lives with his cousin and his cousin's mother  Pt states they all get along well and it is a good living situation  Pt states he had one prior inpt psychiatric admit and 2 inpt rehab admits  Pt states that alcohol is the substance he is most prone to use and he is open to outpt addictions counseling, he does not want inpt rehab  Pt did use AA before and liked a sponsor but did not like the religous aspect of the program  Pt states he was only seeing his psychiatrist once every 3 months but states he will see her more often now and is considering that he may be open to seeing a therapist again monthly   Pt does receive SSI/SSD of 5,000 00 monthly and also has a good support system  Pt states his closest support is her cousin and caregiver Azael Fernandez  Pt states he will stay away from [facility], he admits that when he is drunk, he tends to be violent and aggressive  Pt does not drive, his cousin drives him to wherever he needs to go including appointments  Pt is hopeful for discharge early next week

## 2019-11-15 NOTE — PLAN OF CARE
Problem: Risk for Self Injury/Neglect  Goal: Treatment Goal: Remain safe during length of stay, learn and adopt new coping skills, and be free of self-injurious ideation, impulses and acts at the time of discharge  Outcome: Progressing  Goal: Verbalize thoughts and feelings  Description  Interventions:  - Assess and re-assess patient's lethality and potential for self-injury  - Engage patient in 1:1 interactions, daily, for a minimum of 15 minutes  - Encourage patient to express feelings, fears, frustrations, hopes  - Establish rapport/trust with patient   Outcome: Progressing  Goal: Attend and participate in unit activities, including therapeutic, recreational, and educational groups  Description  Interventions:  - Provide therapeutic and educational activities daily, encourage attendance and participation, and document same in the medical record  - Obtain collateral information, encourage visitation and family involvement in care   Outcome: Progressing     Problem: SUBSTANCE USE/ABUSE  Goal: By discharge, will develop insight into their chemical dependency and sustain motivation to continue in recovery  Description  INTERVENTIONS:  - Attends all daily group sessions and scheduled AA groups  - Actively practices coping skills through participation in the therapeutic community and adherence to program rules  - Reviews and completes assignments from individual treatment plan  - Assist patient development of understanding of their personal cycle of addiction and relapse triggers  Outcome: Progressing  Goal: By discharge, patient will have ongoing treatment plan addressing chemical dependency  Description  INTERVENTIONS:  - Assist patient with resources and/or appointments for ongoing recovery based living  Outcome: Progressing     Problem: SAFETY, RESTRAINT - VIOLENT/SELF-DESTRUCTIVE  Goal: Remains free of harm/injury from restraints (Restraint for Violent/Self-Destructive Behavior)  Description  INTERVENTIONS:  - Instruct patient/family regarding restraint use   - Assess and monitor physiologic and psychological status   - Provide interventions and comfort measures to meet assessed patient needs   - Ensure continuous in person monitoring is provided   - Identify and implement measures to help patient regain control  - Assess readiness for release of restraint  Outcome: Progressing  Goal: Returns to optimal restraint-free functioning  Description  INTERVENTIONS:  - Assess the patient's behavior and symptoms that indicate continued need for restraint  - Identify and implement measures to help patient regain control  - Assess readiness for release of restraint   Outcome: Progressing

## 2019-11-15 NOTE — PROGRESS NOTES
Pt attended tx team mtg  Reviewed tx plan  Pt signed plan  Pt from [place]  Said he went to the [facility] in [place], [place], for his tx  1st psych admit

## 2019-11-15 NOTE — CASE MANAGEMENT
Pt said that he never was suicidal  Odilon Nolas[place], didn't have a way home, had nowhere to go, & it was cold outside  Odilon Nolasco he was NEVER suicidal  But said he was suicidal to come to the hospital  Odilon Nolasco he lived with a caretaker, who helped him  Said he did not drive  Odilon Gaurav he was a   SW asked where he went for tx  Pt said to the [facility] in [place], [place]  Said he did not want a therapist  Does not like therapy  Signed FAROOQ for [employer]

## 2019-11-15 NOTE — PROGRESS NOTES
11/15/19 0700   Team Meeting   Meeting Type Daily Rounds   Initial Conference Date 11/15/19   Team Members Present   Team Members Present Physician;Nurse;; Other (Discipline and Name)   Nursing Team Member [place] Work Team Member Paul Cline   Other (Discipline and Name) Dr Yesi Reyna (Resident), Keli Milan   Patient/Family Present   Patient Present No   Patient's Family Present No     Start medications

## 2019-11-15 NOTE — ASSESSMENT & PLAN NOTE
Patient noted to have elevated cholesterol however only recommend diet exercise at this time and no medications

## 2019-11-15 NOTE — PROGRESS NOTES
KASPER GROUP NOTE  Participated in group activities  Left prior to group discussion  Minimal interaction with group facilitator or peers  11/15/19 1000   Activity/Group Checklist   Group Personal control  (mindfulness)   Attendance Attended   Attendance Duration (min) 16-30   Interactions Interacted appropriately   Affect/Mood Appropriate   Goals Achieved Able to listen to others; Able to engage in interactions

## 2019-11-15 NOTE — CONSULTS
Consult- Daryle Cotton 1980, 46 y o  male MRN: [id number]    Unit/Bed#: Dee Lugo 002-26 Encounter: [id number]    Primary Care Provider: No primary care provider on file  Date and time admitted to hospital: 11/14/2019  5:14 PM      Inpatient consult for Medical Clearance for [facility] patient  Consult performed by: Patti Wells MD  Consult ordered by: Twyla Litten, CRNP          Medical clearance for psychiatric admission  Assessment & Plan  Reviewed metabolic profile and EKG  Patient is cleared for inpatient behavioral health treatment  Avoid any hepatotoxic medication    Mixed hyperlipidemia  Assessment & Plan  Patient noted to have elevated cholesterol however only recommend diet exercise at this time and no medications  Tobacco abuse  Assessment & Plan  Counseled on smoking cessation and placed on nicotine replacement therapy    Transaminitis  Assessment & Plan  Patient noted to have transaminitis  He denies any alcohol abuse to me however there is history of occasional alcohol use in his history  Will recheck CMP tomorrow and also check acute hepatitis panel and do an ultrasound of the liver to rule out any masses  Follow-up CMP in 4 weeks to be followed up by outpatient PCP  Avoid any hepatotoxic medications including Tylenol        VTE Prophylaxis: Reason for no pharmacologic prophylaxis Low risk  / reason for no mechanical VTE prophylaxis Low risk     Recommendations for Discharge:  · Follow up outpatient PCP and repeat CMP in 4 weeks    Counseling / Coordination of Care Time: 1 hour  Greater than 50% of total time spent on patient counseling and coordination of care  Collaboration of Care: Were Recommendations Directly Discussed with Primary Treatment Team? - Yes     History of Present Illness:    Daryle Cotton is a 46 y o  male who is originally admitted to the psychiatric service due to depression  We are consulted for medical management    Patient denies any chest pain shortness of breath or abdominal pain  He states he feels well    Review of Systems:    Review of Systems   Constitutional: Negative for appetite change, chills, fatigue and fever  HENT: Negative for hearing loss, sore throat and trouble swallowing  Eyes: Negative for photophobia, discharge and visual disturbance  Respiratory: Negative for chest tightness and shortness of breath  Cardiovascular: Negative for chest pain and palpitations  Gastrointestinal: Negative for abdominal pain, blood in stool and vomiting  Endocrine: Negative for polydipsia and polyuria  Genitourinary: Negative for difficulty urinating, dysuria, flank pain and hematuria  Musculoskeletal: Negative for back pain and gait problem  Skin: Negative for rash  Allergic/Immunologic: Negative for environmental allergies and food allergies  Neurological: Negative for dizziness, seizures, syncope and headaches  Hematological: Does not bruise/bleed easily  Psychiatric/Behavioral: Positive for behavioral problems  The patient is nervous/anxious  All other systems reviewed and are negative  Past Medical and Surgical History:     Past Medical History:   Diagnosis Date    Depression     PTSD (post-traumatic stress disorder)     Severe episode of recurrent major depressive disorder, without psychotic features [facility] ) 11/15/2019       History reviewed  No pertinent surgical history      Meds/Allergies:    all medications and allergies reviewed    Allergies: No Known Allergies    Social History:     Marital Status: Unknown    Substance Use History:   Social History     Substance and Sexual Activity   Alcohol Use Yes    Frequency: Monthly or less    Drinks per session: 1 or 2    Binge frequency: Never     Social History     Tobacco Use   Smoking Status Heavy Tobacco Smoker    Packs/day: 0 50    Types: Cigarettes   Smokeless Tobacco Never Used     Social History     Substance and Sexual Activity   Drug Use Never       Family History:    Family History   Family history unknown: Yes       Physical Exam:     Vitals:   Blood Pressure: 147/89 (11/15/19 1122)  Pulse: 85 (11/15/19 1122)  Temperature: (!) 97 1 °F (36 2 °C) (11/15/19 0729)  Temp Source: Temporal (11/15/19 0729)  Respirations: 16 (11/15/19 0729)  Height: 6' 1" (185 4 cm) (11/14/19 1716)  Weight - Scale: 102 kg (225 lb 1 4 oz) (11/14/19 1716)  SpO2: 96 % (11/14/19 1716)    Physical Exam   Constitutional: He is oriented to person, place, and time  He appears well-developed and well-nourished  HENT:   Head: Normocephalic and atraumatic  Right Ear: External ear normal    Left Ear: External ear normal    Mouth/Throat: Oropharynx is clear and moist    Eyes: Pupils are equal, round, and reactive to light  Conjunctivae and EOM are normal    Neck: Normal range of motion  Neck supple  Cardiovascular: Normal rate, regular rhythm, normal heart sounds and intact distal pulses  Pulmonary/Chest: Effort normal and breath sounds normal    Abdominal: Soft  Bowel sounds are normal  He exhibits no mass  There is no tenderness  There is no rebound and no guarding  Genitourinary:   Genitourinary Comments: deferred   Musculoskeletal: Normal range of motion  Neurological: He is alert and oriented to person, place, and time  He has normal reflexes  Cranial nerves 2-12 are normal   Normal neurological exam   Skin: Skin is warm and dry  No rash noted  Psychiatric: He has a normal mood and affect  Nursing note and vitals reviewed  Additional Data:     Lab Results: I have personally reviewed pertinent reports        Results from last 7 days   Lab Units 11/15/19  0606   WBC Thousand/uL 6 40   HEMOGLOBIN g/dL 15 0   HEMATOCRIT % 44 7   PLATELETS Thousands/uL 326   NEUTROS PCT % 56   LYMPHS PCT % 32   MONOS PCT % 10   EOS PCT % 2     Results from last 7 days   Lab Units 11/15/19  0606   SODIUM mmol/L 138   POTASSIUM mmol/L 4 3   CHLORIDE mmol/L 103   CO2 mmol/L 28   BUN mg/dL 13 CREATININE mg/dL 0 84   ANION GAP mmol/L 7   CALCIUM mg/dL 9 4   ALBUMIN g/dL 4 3   TOTAL BILIRUBIN mg/dL 0 70   ALK PHOS U/L 75   ALT U/L 95*   AST U/L 86*   GLUCOSE RANDOM mg/dL 96             No results found for: HGBA1C            Imaging: I have personally reviewed pertinent reports  US liver    (Results Pending)       EKG, Pathology, and Other Studies Reviewed on Admission:   · EKG:  Normal sinus rhythm    ** Please Note: This note has been constructed using a voice recognition system   **

## 2019-11-15 NOTE — CASE MANAGEMENT
CM spoke with pt's cousin Samanthaeduin Hightower who appears to be a very involved caregiver and very supportive of the pt  Samantha Hightower states he already has an action plan for the pt which addresses the issues that led the pt to be hospitalized and that he has been in touch with the pt's psychiatrist  Samantha Hightower states that the pt's psychiatrist, Dr Artur Fitzpatrick, is an excellent doctor and is very good with the pt and she will be seeing the pt more frequently after his hospital discharge  The reason the pt was not consistent with his therapy was that there is a shortage of therapists at the [facility] and that they are frequently turning over and the pt was having a different therapist constantly  Samantha Hightower states he will get the pt involved in the addiction groups at the [facility] and in therapy again  Samantha Hightower states that the pt typically does not have anger issues but that he does have them when he is drunk   Samantha Hightower will  pt at discharge but needs 24 hour notice because he is 1 5 hours away from hospital

## 2019-11-15 NOTE — TREATMENT PLAN
TREATMENT PLAN REVIEW - [address] 46 y o  1980 male MRN: [id number]    [address] [address] Room / Bed: Susannah Epstein 861- Encounter: [id number]          Admit Date/Time:  11/14/2019  5:14 PM    Treatment Team: Attending Provider: Jeannie Ardon MD; Consulting Physician: Bre Zaman MD; Patient Care Assistant: David Kennedy; Registered Nurse: Amina Wolfe RN; Patient Care Technician: Tiera Cisneros;  Patient Care Technician: Neetu Steve; Nursing Student: Princess Martinez; : Corina Kaminski RN    Diagnosis:   Patient Strengths: average or above intelligence, capable of independent living     Patient Barriers: limited family ties    Short Term Goals: decrease in depressive symptoms, decrease in suicidal thoughts    Long Term Goals: improvement in depression, stabilization of mood, free of suicidal thoughts, improved reality testing, improved insight    Progress Towards Goals: starting psychitric medications as prescribed    Recommended Treatment: medication management, patient medication education, group therapy, milieu therapy, continued Behavioral Health psychiatric evaluation/assessment process     Treatment Frequency: daily medication monitoring, group and milieu therapy daily, monitoring through interdisciplinary rounds, monitoring through weekly patient care conferences    Expected Discharge Date:  4-5    Discharge Plan: referral for outpatient medication management with a psychiatrist, referral for outpatient psychotherapy    Treatment Plan Created/Updated By: Jeannie Ardon MD

## 2019-11-15 NOTE — PROGRESS NOTES
Observed client out in the hallway on way back to room  Client wants to be in room now  Client slightly brightens with approach, but is constricted and scant  Client denies any and all symptoms at this time  Client sts "I know why I'm here, I'm not suicidal at all though"   Client encouraged and left to rest

## 2019-11-15 NOTE — ASSESSMENT & PLAN NOTE
Reviewed metabolic profile and EKG    Patient is cleared for inpatient behavioral health treatment  Avoid any hepatotoxic medication

## 2019-11-15 NOTE — PROGRESS NOTES
Pt received prn Trazodone for sleep assistance  Medication was effective  Pt has no further complaints about difficulty sleeping on reassessment

## 2019-11-15 NOTE — H&P
Psychiatric Evaluation - 179-00 Yamil June 46 y o  male MRN: [id number]  Unit/Bed#: [facility]iv[facility] Ro[facility] 250-59 Encounter: [id number]    Assessment/Plan   Principal Problem:    Severe episode of recurrent major depressive disorder, without psychotic features [facility])    Plan:   Check admission labs  Collaborate with family for baseline assessment and disposition planning  Start Zoloft 220 mg qd and Seroquel 50 mg qhs  Start Neurontin 300 mg tid for 3 days, then 100 mg tid for 1 day  Treatment options and alternatives were reviewed with the patient, who concurs with the above plan  Risks, benefits, and possible side effects of medications were explained to the patient, and he verbalizes understanding       -----------------------------------    Chief Complaint: "I said I was suicidal just to get a warm place to stay for the night"    History of Present Illness     Karan Alvarez is a 46 y o  male with a history of depression and PTSD who presents on a voluntarily basis for suicidal and homicidal ideations  Per ED provider:  "The patient states that he has been drinking alcohol this evening, and that he was either going to jump off of a bridge or he was going to kill someone and steal their vehicle  He told someone to call police and police subsequently came and evaluated the patient, EMS was called as well  Patient was combative, had to be physically restrained secondary to aggressive behavior  Patient has been yelling racial slurs and threatening to harm police officers EMS  On arrival he is yelling profanities, verbally abusive and aggressive  Patient restrained for safety of self and for safety of medical staff "    Karan Alvarez states he went to [employer] Wednesday night with a friend, and relapsed on alcohol (8 beers, SUPRIYA 0 084) after being sober for 2 yrs   He states "I usually avoid going to places where there's a lot of alcohol " He states "My friend left me at the [employer], I had no where to go, and it was freezing outside so I told the  to call the police because I was having sucicidal thoughts, but I just said that because I knew I'd get taken to the hospital and have somewhere to stay for the night " He admits he thought he would just be observed in the ED overnight, he did not think he would get admitted to the psychiatric unit  The patient currently lives in [place] with his cousin Don Johnson) who is his POA  He follows at [facility] and states he is compliant on his medications  He admits to re-current nightmares, which usually involves death and causes nighttime awakenings  He was seeing a therapist, but stopped, stating "talk therapy makes me uncomfortable " He admits he has hard time being around people and going out in public  He 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, and admits it's been tough for him to be around that many people, but states that he's willing to stick it out  He states he is very anxious today (7/10) and reports that he is "usually depressed at baseline " He denies any history of seizures or withdrawals, but does admit to feeling a bit "shaky" today  He denies any history of suicidal ideations or attempts and today denies any suicidal ideations, intent or plan  The patient is consenting for safety in the unit      Medical Review Of Systems:  anxiety    Psychiatric Review Of Systems:  Problems with sleep: night time awakenings, total 5 hrs a night  Appetite changes: no  Weight changes: no  Low energy/anergy: yes  Low interest/pleasure/anhedonia: no  Somatic symptoms: no  Anxiety/panic: yes, anxiety  Ileana: no  Guilt/hopeless: yes  Self injurious behavior/risky behavior: no    Historical Information     Psychiatric History:   Psychiatric medication trial: seroquel, zoloft  Inpatient hospitalizations: Denies  Suicide attempts: Denies  Violent behavior: Denies  [facility]     Substance Abuse History:  Social History     Tobacco Use    Smoking status: Heavy Tobacco Smoker     Packs/day: 0 50     Types: Cigarettes    Smokeless tobacco: Never Used   Substance Use Topics    Alcohol use: Yes     Frequency: Monthly or less     Drinks per session: 1 or 2     Binge frequency: Never    Drug use: Never      Patient reports history of heroin and coccaine abuse, sober for 5 yrs  Also history of alcohol abuse, sober for 2 yrs prior to Wednesday's relapse   I have assessed this patient for substance use within the past 12 months  I spent time with Gallo Goddard in counseling and education on risk of substance abuse  I assessed motivation and encouraged him for treatment as appropriate  Family Psychiatric History:   Dad: Alcohol abuse    Social History:  Education: some college  Learning Disabilities: denies  Marital history: single  Living arrangement: Lives in a home with his cousin Monna Gaucher) and his aunt  Occupational History: on disability  Functioning Relationships: good support system    Other Pertinent History:  Service: branch: [employer] and discharge year: 2008      Traumatic History:   Abuse: denies  Other Traumatic Events: other traumatic events: serving in [employer], witnessed deaths    Past Medical History:   Past Medical History:   Diagnosis Date    Depression     PTSD (post-traumatic stress disorder)     Severe episode of recurrent major depressive disorder, without psychotic features ([facility] [place] [facility] ) 11/15/2019        -----------------------------------  Objective    Temp:  [97 1 °F (36 2 °C)-97 6 °F (36 4 °C)] 97 1 °F (36 2 °C)  HR:  [] 87  Resp:  [16-20] 16  BP: (113-132)/(57-80) 117/67    Mental Status Evaluation:  Appearance:  alert, casually dressed, appears stated age and appropriate grooming and hygiene   Behavior:  pleasant, cooperative, sitting comfortably, fair eye contact, no abnormal movements and normal gait and balance   Speech:  spontaneous, clear, normal rate, normal volume and coherent   Mood:  anxious   Affect:  mood-congruent and anxious   Thought Process:  organized, logical, coherent, linear, goal directed, normal rate of thoughts   Thought Content: no verbalized delusions, no overt paranoia, no obsessive thinking   Perceptual disturbances: no reported auditory hallucinations, no reported visual hallucinations and does not appear to be responding to internal stimuli at this time   Risk Potential: No active or passive suicidal or homicidal ideation   Cognition: oriented to person, place, time, and situation, memory grossly intact, appears to be of average intelligence, normal abstract reasoning and age-appropriate attention span and concentration   Insight:  Fair   Judgment: Limited     Meds/Allergies   No Known Allergies  all current active meds have been reviewed and current meds:   Current Facility-Administered Medications   Medication Dose Route Frequency    acetaminophen (TYLENOL) tablet 650 mg  650 mg Oral Q6H PRN    acetaminophen (TYLENOL) tablet 650 mg  650 mg Oral Q4H PRN    acetaminophen (TYLENOL) tablet 975 mg  975 mg Oral Q6H PRN    aluminum-magnesium hydroxide-simethicone (MYLANTA) 200-200-20 mg/5 mL oral suspension 30 mL  30 mL Oral Q4H PRN    benztropine (COGENTIN) injection 1 mg  1 mg Intramuscular Q6H PRN    benztropine (COGENTIN) tablet 1 mg  1 mg Oral Q6H PRN    haloperidol (HALDOL) tablet 5 mg  5 mg Oral Q6H PRN    haloperidol lactate (HALDOL) injection 5 mg  5 mg Intramuscular Q6H PRN    hydrOXYzine HCL (ATARAX) tablet 25 mg  25 mg Oral Q6H PRN    LORazepam (ATIVAN) 2 mg/mL injection 1 mg  1 mg Intramuscular Q6H PRN    LORazepam (ATIVAN) tablet 1 mg  1 mg Oral Q6H PRN    magnesium hydroxide (MILK OF MAGNESIA) 400 mg/5 mL oral suspension 30 mL  30 mL Oral Daily PRN    nicotine (NICODERM CQ) 21 mg/24 hr TD 24 hr patch 1 patch  1 patch Transdermal Daily    nicotine polacrilex (NICORETTE) gum 2 mg  2 mg Oral Q2H PRN    OLANZapine (ZyPREXA) IM injection 10 mg  10 mg Intramuscular Q6H PRN    OLANZapine (ZyPREXA) tablet 10 mg  10 mg Oral Q6H PRN    QUEtiapine (SEROquel) tablet 50 mg  50 mg Oral HS    risperiDONE (RisperDAL M-TABS) dispersible tablet 2 mg  2 mg Oral Q3H PRN    sertraline (ZOLOFT) tablet 200 mg  200 mg Oral Daily    traZODone (DESYREL) tablet 50 mg  50 mg Oral HS PRN       Behavioral Health Medications: all current active meds have been reviewed  Changes as above  Laboratory results:  I have personally reviewed all pertinent laboratory/tests results    Recent Results (from the past 48 hour(s))   POCT alcohol breath test    Collection Time: 11/14/19  4:15 AM   Result Value Ref Range    EXTBreath Alcohol 0 084    Rapid drug screen, urine    Collection Time: 11/14/19  6:48 AM   Result Value Ref Range    Amph/Meth UR Negative Negative    Barbiturate Ur Negative Negative    Benzodiazepine Urine Negative Negative    Cocaine Urine Negative Negative    Methadone Urine Negative Negative    Opiate Urine Negative Negative    PCP Ur Negative Negative    THC Urine Negative Negative   Urinalysis with reflex to microscopic    Collection Time: 11/14/19  6:02 PM   Result Value Ref Range    Color, UA Sridevi (A) Straw, Yellow, Pale Yellow    Clarity, UA Clear Clear, Other    Specific Gravity, UA 1 025 1 003 - 1 040    pH, UA 5 0 4 5, 5 0, 5 5, 6 0, 6 5, 7 0, 7 5, 8 0    Leukocytes, UA 25 0 (A) Negative    Nitrite, UA Negative Negative    Protein, UA 15 (Trace) (A) Negative mg/dl    Glucose, UA Negative Negative mg/dl    Ketones, UA Negative Negative mg/dl    Bilirubin, UA Negative Negative    Blood, UA 25 0 (A) Negative    UROBILINOGEN UA Negative 1 0, Negative mg/dL   Urine Microscopic    Collection Time: 11/14/19  6:02 PM   Result Value Ref Range    RBC, UA 1-2 (A) None Seen, 0-5 /hpf    WBC, UA 2-4 (A) None Seen, 0-5, 5-55, 5-65 /hpf    Epithelial Cells Occasional None Seen, Occasional /hpf    Bacteria, UA None Seen None Seen, Occasional /hpf    MUCUS THREADS Moderate (A) None Seen   ECG 12 lead    Collection Time: 11/14/19  7:05 PM   Result Value Ref Range    Ventricular Rate 83 BPM    Atrial Rate 83 BPM    KY Interval 126 ms    QRSD Interval 88 ms    QT Interval 372 ms    QTC Interval 437 ms    P Axis 60 degrees    QRS Axis 67 degrees    T Wave Axis 41 degrees   CBC and differential    Collection Time: 11/15/19  6:06 AM   Result Value Ref Range    WBC 6 40 4 50 - 11 00 Thousand/uL    RBC 5 29 4 50 - 5 90 Million/uL    Hemoglobin 15 0 13 5 - 17 5 g/dL    Hematocrit 44 7 41 0 - 53 0 %    MCV 84 80 - 100 fL    MCH 28 4 26 0 - 34 0 pg    MCHC 33 6 31 0 - 36 0 g/dL    RDW 13 4 <15 3 %    MPV 8 4 (L) 8 9 - 12 7 fL    Platelets 917 852 - 386 Thousands/uL    Neutrophils Relative 56 45 - 65 %    Lymphocytes Relative 32 25 - 45 %    Monocytes Relative 10 1 - 10 %    Eosinophils Relative 2 0 - 6 %    Basophils Relative 1 0 - 1 %    Neutrophils Absolute 3 60 1 80 - 7 80 Thousands/µL    Lymphocytes Absolute 2 10 0 50 - 4 00 Thousands/µL    Monocytes Absolute 0 60 0 20 - 0 90 Thousand/µL    Eosinophils Absolute 0 10 0 00 - 0 40 Thousand/µL    Basophils Absolute 0 00 0 00 - 0 10 Thousands/µL   Comprehensive metabolic panel    Collection Time: 11/15/19  6:06 AM   Result Value Ref Range    Sodium 138 137 - 147 mmol/L    Potassium 4 3 3 6 - 5 0 mmol/L    Chloride 103 97 - 108 mmol/L    CO2 28 22 - 30 mmol/L    ANION GAP 7 5 - 14 mmol/L    BUN 13 5 - 25 mg/dL    Creatinine 0 84 0 70 - 1 50 mg/dL    Glucose 96 70 - 99 mg/dL    Glucose, Fasting 96 70 - 99 mg/dL    Calcium 9 4 8 4 - 10 2 mg/dL    AST 86 (H) 17 - 59 U/L    ALT 95 (H) 9 - 52 U/L    Alkaline Phosphatase 75 43 - 122 U/L    Total Protein 8 3 5 9 - 8 4 g/dL    Albumin 4 3 3 0 - 5 2 g/dL    Total Bilirubin 0 70 <1 30 mg/dL    eGFR 111 >60 ml/min/1 73sq m   Lipid panel    Collection Time: 11/15/19  6:06 AM   Result Value Ref Range    Cholesterol 193 <200 mg/dL    Triglycerides 105 <150 mg/dL    HDL, Direct 36 (L) >=40 mg/dL    LDL Calculated 136 (H) <130 mg/dL    Non-HDL-Chol (CHOL-HDL) 157 mg/dl   TSH, 3rd generation    Collection Time: 11/15/19  6:06 AM   Result Value Ref Range    TSH 3RD GENERATON 0 961 0 465 - 4 680 uIU/mL        Imaging Studies:   No orders to display            -----------------------------------    Risks / Benefits of Treatment:     Risks, benefits, and possible side effects of medications explained to patient  The patient verbalizes understanding and agreement for treatment  Counseling / Coordination of Care:     Patient's presentation on admission and proposed treatment plan were discussed with the treatment team   Diagnosis, medication changes and treatment plan were reviewed with the patient  Recent stressors were discussed with the patient  Events leading to admission were reviewed with the patient  Importance of medication and treatment compliance was reviewed with the patient  Discussed with patient plan for alcohol detoxification protocol and gradual taper of medications to prevent withdrawal symptoms  Inpatient Psychiatric Certification:     Certification: Based upon physical, mental and social evaluations, I certify that inpatient psychiatric services are medically necessary for this patient for a duration of more than 2 midnights for the treatment of Severe episode of recurrent major depressive disorder, without psychotic features ([facility] )    Available alternative community resources do not meet the patient's mental health care needs  I further attest that an established written individualized plan of care has been implemented and is outlined in the patient's medical records  This note has been constructed using a voice recognition system  There may be translation, syntax, or grammatical errors  If you have any questions, please contact the dictating provider

## 2019-11-16 LAB
ALBUMIN SERPL BCP-MCNC: 4 G/DL (ref 3–5.2)
ALP SERPL-CCNC: 68 U/L (ref 43–122)
ALT SERPL W P-5'-P-CCNC: 85 U/L (ref 9–52)
ANION GAP SERPL CALCULATED.3IONS-SCNC: 4 MMOL/L (ref 5–14)
AST SERPL W P-5'-P-CCNC: 69 U/L (ref 17–59)
BILIRUB SERPL-MCNC: 0.5 MG/DL
BUN SERPL-MCNC: 11 MG/DL (ref 5–25)
CALCIUM SERPL-MCNC: 9.2 MG/DL (ref 8.4–10.2)
CHLORIDE SERPL-SCNC: 102 MMOL/L (ref 97–108)
CO2 SERPL-SCNC: 31 MMOL/L (ref 22–30)
CREAT SERPL-MCNC: 0.92 MG/DL (ref 0.7–1.5)
GFR SERPL CREATININE-BSD FRML MDRD: 105 ML/MIN/1.73SQ M
GLUCOSE P FAST SERPL-MCNC: 98 MG/DL (ref 70–99)
GLUCOSE SERPL-MCNC: 98 MG/DL (ref 70–99)
HAV IGM SER QL: NORMAL
HBV CORE IGM SER QL: NORMAL
HBV SURFACE AG SER QL: NORMAL
HCV AB SER QL: NORMAL
POTASSIUM SERPL-SCNC: 4.3 MMOL/L (ref 3.6–5)
PROT SERPL-MCNC: 7.7 G/DL (ref 5.9–8.4)
SODIUM SERPL-SCNC: 137 MMOL/L (ref 137–147)

## 2019-11-16 PROCEDURE — 80053 COMPREHEN METABOLIC PANEL: CPT | Performed by: FAMILY MEDICINE

## 2019-11-16 PROCEDURE — 80074 ACUTE HEPATITIS PANEL: CPT | Performed by: FAMILY MEDICINE

## 2019-11-16 PROCEDURE — 99232 SBSQ HOSP IP/OBS MODERATE 35: CPT | Performed by: PSYCHIATRY & NEUROLOGY

## 2019-11-16 RX ORDER — QUETIAPINE FUMARATE 100 MG/1
100 TABLET, FILM COATED ORAL
Status: DISCONTINUED | OUTPATIENT
Start: 2019-11-16 | End: 2019-11-19 | Stop reason: HOSPADM

## 2019-11-16 RX ADMIN — GABAPENTIN 300 MG: 300 CAPSULE ORAL at 17:32

## 2019-11-16 RX ADMIN — NICOTINE 1 PATCH: 21 PATCH, EXTENDED RELEASE TRANSDERMAL at 09:14

## 2019-11-16 RX ADMIN — NICOTINE POLACRILEX 2 MG: 2 GUM, CHEWING ORAL at 19:19

## 2019-11-16 RX ADMIN — QUETIAPINE FUMARATE 100 MG: 100 TABLET ORAL at 21:20

## 2019-11-16 RX ADMIN — GABAPENTIN 300 MG: 300 CAPSULE ORAL at 21:20

## 2019-11-16 RX ADMIN — SERTRALINE HYDROCHLORIDE 225 MG: 50 TABLET ORAL at 09:13

## 2019-11-16 RX ADMIN — FOLIC ACID 1 MG: 1 TABLET ORAL at 09:13

## 2019-11-16 RX ADMIN — NICOTINE POLACRILEX 2 MG: 2 GUM, CHEWING ORAL at 11:38

## 2019-11-16 RX ADMIN — THIAMINE HCL TAB 100 MG 100 MG: 100 TAB at 09:13

## 2019-11-16 RX ADMIN — NICOTINE POLACRILEX 2 MG: 2 GUM, CHEWING ORAL at 14:27

## 2019-11-16 RX ADMIN — GABAPENTIN 300 MG: 300 CAPSULE ORAL at 09:14

## 2019-11-16 NOTE — PROGRESS NOTES
Progress Note - Donalod 46 y o  male MRN: [id number]   Unit/Bed#: Mateo Joseph 631-20 Encounter: [id number]    Behavior over the last 24 hours: improving  Mp Stein feels less depressed and rates mood as 3 on a scale of 1 (best mood) to 10 (worst mood) today  He states that anxiety symptoms are less prominent as well  He denies suicidal thoughts today  Has brighter affect  Compliant with medications  Attends some groups  Sleep: normal  Appetite: normal  Medication side effects: No   ROS: no complaints, denies any shortness of breath, chest pain or abdominal pain    Mental Status Evaluation:    Appearance:  casually dressed   Behavior:  cooperative, fair eye contact   Speech:  normal rate and volume   Mood:  less anxious, less depressed   Affect:  brighter   Thought Process:  organized, concrete   Associations: concrete associations   Thought Content:  no overt delusions   Perceptual Disturbances: no auditory hallucinations, no visual hallucinations   Risk Potential: Suicidal ideation - None at present, but had suicidal ideation prior to admission  Homicidal ideation - None  Potential for aggression - No   Sensorium:  oriented to person, place and time/date   Memory:  recent and remote memory grossly intact   Consciousness:  alert and awake   Attention: decreased concentration and decreased attention span   Insight:  limited   Judgment: limited   Gait/Station: normal gait/station, normal balance   Motor Activity: no abnormal movements     Vital signs in last 24 hours:    Temp:  [97 8 °F (36 6 °C)] 97 8 °F (36 6 °C)  HR:  [80-92] 80  Resp:  [16] 16  BP: (125-147)/(66-89) 125/66    Laboratory results: I have personally reviewed all pertinent laboratory/tests results      Most Recent Labs:   Lab Results   Component Value Date    WBC 6 40 11/15/2019    RBC 5 29 11/15/2019    HGB 15 0 11/15/2019    HCT 44 7 11/15/2019     11/15/2019    RDW 13 4 11/15/2019    NEUTROABS 3 60 11/15/2019 SODIUM 137 11/16/2019    K 4 3 11/16/2019     11/16/2019    CO2 31 (H) 11/16/2019    BUN 11 11/16/2019    CREATININE 0 92 11/16/2019    GLUC 98 11/16/2019    GLUF 98 11/16/2019    CALCIUM 9 2 11/16/2019    AST 69 (H) 11/16/2019    ALT 85 (H) 11/16/2019    ALKPHOS 68 11/16/2019    TP 7 7 11/16/2019    ALB 4 0 11/16/2019    TBILI 0 50 11/16/2019    CHOLESTEROL 193 11/15/2019    HDL 36 (L) 11/15/2019    TRIG 105 11/15/2019    LDLCALC 136 (H) 11/15/2019    NONHDLC 157 11/15/2019    BFI4SUADEAOM 0 961 11/15/2019    RPR Non-Reactive 11/15/2019   Drug Screen:   Lab Results   Component Value Date    AMPMETHUR Negative 11/14/2019    BARBTUR Negative 11/14/2019    BDZUR Negative 11/14/2019    THCUR Negative 11/14/2019    COCAINEUR Negative 11/14/2019    METHADONEUR Negative 11/14/2019    OPIATEUR Negative 11/14/2019    PCPUR Negative 11/14/2019   EKG   Lab Results   Component Value Date    VENTRATE 83 11/14/2019    ATRIALRATE 83 11/14/2019    PRINT 126 11/14/2019    QRSDINT 88 11/14/2019    QTINT 372 11/14/2019    QTCINT 437 11/14/2019    PAXIS 60 11/14/2019    QRSAXIS 67 11/14/2019    TWAVEAXIS 41 11/14/2019       Recent Imaging Studies:     Procedure: Us Right Upper Quadrant; Result Date: 11/15/2019; Narrative: RIGHT UPPER QUADRANT ULTRASOUND INDICATION:     Right upper quadrant abdominal pain  COMPARISON:  None   Impression: Hepatic steatosis       Suicide/Homicide Risk Assessment:    Risk of Harm to Self:   Current Specific Risk Factors include: recent suicidal threats, diagnosis of mood disorder, current anxiety symptoms  Protective Factors: no current suicidal ideation, ability to communicate with staff on the unit, taking medications as ordered on the unit, improved depressive symptoms, improved anxiety symptoms  Based on today's assessment, Nury Chau presents the following risk of harm to self: moderate    Risk of Harm to Others:  Based on today's assessment, Nury Chau presents the following risk of harm to others: none    The following interventions are recommended: behavioral checks every 7 minutes, continued hospitalization on locked unit    Progress Toward Goals: progressing, less anxious, less depressed, no longer suicidal, working on coping skills    Assessment/Plan   Principal Problem:    Major depressive disorder, recurrent, severe without psychotic features ([facility] )  Active Problems:    Post-traumatic stress disorder, chronic    Uncomplicated alcohol dependence ([facility] )    Medical clearance for psychiatric admission    Transaminitis    Tobacco abuse    Mixed hyperlipidemia    Recommended Treatment:     Planned medication and treatment changes:     All current active medications have been reviewed  Encourage group therapy, milieu therapy and occupational therapy  Behavioral Health checks every 7 minutes  Increase Seroquel to 100 mg at bedtime to help with mood and PTSD symptoms    Continue all other medications:    Current Facility-Administered Medications:  acetaminophen 975 mg Oral Q6H PRN Luci L Zuleika, CRNP   aluminum-magnesium hydroxide-simethicone 30 mL Oral Q4H PRN Luci L Zuleika, CRNP   benztropine 1 mg Intramuscular Q6H PRN Luci L Zuleika, CRNP   benztropine 1 mg Oral Q6H PRN Luci L Zuleika, CRNP   folic acid 1 mg Oral Daily MD Tonya Bazan ON 11/19/2019] gabapentin 100 mg Oral TID Asmita Dee MD   gabapentin 300 mg Oral TID Asmita Dee MD   haloperidol 5 mg Oral Q6H PRN Luci L Zuleika, CRNP   haloperidol lactate 5 mg Intramuscular Q6H PRN Luci L Zuleika, CRNP   hydrOXYzine HCL 25 mg Oral Q6H PRN Luci L Zuleika, CRNP   LORazepam 1 mg Intramuscular Q6H PRN Luci L Zuleika, CRNP   LORazepam 1 mg Oral Q6H PRN Luci L Zuleika, CRNP   magnesium hydroxide 30 mL Oral Daily PRN Luci L Zuleika, CRNP   nicotine 1 patch Transdermal Daily Asmita Dee MD   nicotine polacrilex 2 mg Oral Q2H PRN Asmita Dee MD   OLANZapine 10 mg Intramuscular Q6H PRN Luci L MICA To   OLANZapine 10 mg Oral Q6H PRN MICA Galindo   QUEtiapine 100 mg Oral HS Pradeep Joseph MD   risperiDONE 2 mg Oral Q3H PRN MICA Galindo   sertraline 225 mg Oral Daily Loraine Gallegos MD   thiamine 100 mg Oral Daily Montez Astorga MD   traZODone 50 mg Oral HS PRN MICA Galindo       Risks / Benefits of Treatment:    Risks, benefits, and possible side effects of medications explained to patient and patient verbalizes understanding and agreement for treatment  Counseling / Coordination of Care:    Patient's progress reviewed with nursing staff  Medications, treatment progress and treatment plan reviewed with patient  Medication changes discussed with patient      Rozina Calvillo MD 11/16/19

## 2019-11-16 NOTE — PROGRESS NOTES
Patient stated the "only reason Im here is [place] I was suicidal and to call the police  I only did it because it was cold and I had no where to stay  I wont do that again because I didn't realize I would be here so long "  Patient denies SI and HI and denies A/V hallucinations  Patient has been medication compliant and is cooperative

## 2019-11-16 NOTE — PROGRESS NOTES
Ultra sound of liver done  Patient has been in the milieu, pleasant and appropriate in interaction, cooperative with medications and attended group this evening

## 2019-11-17 LAB
ATRIAL RATE: 83 BPM
P AXIS: 60 DEGREES
PR INTERVAL: 126 MS
QRS AXIS: 67 DEGREES
QRSD INTERVAL: 88 MS
QT INTERVAL: 372 MS
QTC INTERVAL: 437 MS
T WAVE AXIS: 41 DEGREES
VENTRICULAR RATE: 83 BPM

## 2019-11-17 PROCEDURE — 93010 ELECTROCARDIOGRAM REPORT: CPT | Performed by: INTERNAL MEDICINE

## 2019-11-17 PROCEDURE — 99232 SBSQ HOSP IP/OBS MODERATE 35: CPT | Performed by: PSYCHIATRY & NEUROLOGY

## 2019-11-17 RX ORDER — GABAPENTIN 300 MG/1
300 CAPSULE ORAL 3 TIMES DAILY
Status: DISCONTINUED | OUTPATIENT
Start: 2019-11-17 | End: 2019-11-19 | Stop reason: HOSPADM

## 2019-11-17 RX ADMIN — GABAPENTIN 300 MG: 300 CAPSULE ORAL at 17:20

## 2019-11-17 RX ADMIN — FOLIC ACID 1 MG: 1 TABLET ORAL at 09:22

## 2019-11-17 RX ADMIN — NICOTINE POLACRILEX 2 MG: 2 GUM, CHEWING ORAL at 17:25

## 2019-11-17 RX ADMIN — GABAPENTIN 300 MG: 300 CAPSULE ORAL at 09:22

## 2019-11-17 RX ADMIN — NICOTINE POLACRILEX 2 MG: 2 GUM, CHEWING ORAL at 09:33

## 2019-11-17 RX ADMIN — NICOTINE 1 PATCH: 21 PATCH, EXTENDED RELEASE TRANSDERMAL at 09:22

## 2019-11-17 RX ADMIN — NICOTINE POLACRILEX 2 MG: 2 GUM, CHEWING ORAL at 19:10

## 2019-11-17 RX ADMIN — THIAMINE HCL TAB 100 MG 100 MG: 100 TAB at 09:22

## 2019-11-17 RX ADMIN — QUETIAPINE FUMARATE 100 MG: 100 TABLET ORAL at 21:18

## 2019-11-17 RX ADMIN — GABAPENTIN 300 MG: 300 CAPSULE ORAL at 21:18

## 2019-11-17 RX ADMIN — SERTRALINE HYDROCHLORIDE 225 MG: 50 TABLET ORAL at 09:21

## 2019-11-17 RX ADMIN — NICOTINE POLACRILEX 2 MG: 2 GUM, CHEWING ORAL at 12:49

## 2019-11-17 NOTE — PROGRESS NOTES
Patient denies SI and HI and denies A/V hallucinations  Patient has been medication compliant and is cooperative  Patient out on unit and social with peers

## 2019-11-17 NOTE — PROGRESS NOTES
Progress Note - Donaldo 46 y o  male MRN: [id number]   Unit/Bed#: [facility] 147-74 Encounter: [id number]    Behavior over the last 24 hours: improved  Finis Jobs continues to improve, states he feels less depressed and rates mood as 3 on a scale of 1 (best mood) to 10 (worst mood) today  He also reports improvement in anxiety symptoms  He denies suicidal thoughts  Has been taking medications  Attends groups  Social with peers  Signed 72 hour notice yesterday - he feels that he will be ready for discharge after the weekend  Sleep: normal  Appetite: normal  Medication side effects: No   ROS: no complaints, denies any shortness of breath, chest pain or abdominal pain, all other systems are negative    Mental Status Evaluation:    Appearance:  casually dressed   Behavior:  pleasant, cooperative   Speech:  normal rate and volume   Mood:  less anxious, less depressed   Affect:  brighter   Thought Process:  organized, concrete   Associations: concrete associations   Thought Content:  no overt delusions   Perceptual Disturbances: no auditory hallucinations, no visual hallucinations   Risk Potential: Suicidal ideation - None at present  Homicidal ideation - None  Potential for aggression - No   Sensorium:  oriented to person, place and time/date   Memory:  recent and remote memory grossly intact   Consciousness:  alert and awake   Attention: attention span and concentration appear shorter than expected for age   Insight:  improving and moderate   Judgment: improving and moderate   Gait/Station: normal gait/station, normal balance   Motor Activity: no abnormal movements     Vital signs in last 24 hours:    Temp:  [98 2 °F (36 8 °C)-98 3 °F (36 8 °C)] 98 3 °F (36 8 °C)  HR:  [69-89] 69  Resp:  [16] 16  BP: (126-141)/(73) 126/73    Laboratory results: I have personally reviewed all pertinent laboratory/tests results      Hepatitis Panel:   Lab Results   Component Value Date    HEPAIGM Non-reactive 11/16/2019    HEPBIGM Non-reactive 11/16/2019    HEPBSAG Non-reactive 11/16/2019    HEPCAB Non-reactive 11/16/2019       Suicide/Homicide Risk Assessment:    Risk of Harm to Self:   Protective Factors: no current suicidal ideation, taking medications as ordered on the unit, improved depressive symptoms, improved anxiety symptoms  Based on today's assessment, John Smith presents the following risk of harm to self: moderate    Risk of Harm to Others:  Based on today's assessment, John Smith presents the following risk of harm to others: none    The following interventions are recommended: behavioral checks every 7 minutes, continued hospitalization on locked unit    Progress Toward Goals: continues to improve, less anxious, less depressed, not suicidal, working on coping skills    Assessment/Plan   Principal Problem:    Major depressive disorder, recurrent, severe without psychotic features ([facility] )  Active Problems:    Post-traumatic stress disorder, chronic    Uncomplicated alcohol dependence ([facility] )    Medical clearance for psychiatric admission    Transaminitis    Tobacco abuse    Mixed hyperlipidemia    Recommended Treatment:     Planned medication and treatment changes:     All current active medications have been reviewed  Encourage group therapy, milieu therapy and occupational therapy  Behavioral Health checks every 7 minutes  Signed 72 hour notice  Will observe if safe for discharge once 72 hour notice expires  Continue current Neurontin dose of 300 mg tid - he wants to continue Neurontin after discharge as he feels it helps with his anxiety symptoms    Continue all other medications:    Current Facility-Administered Medications:  acetaminophen 975 mg Oral Q6H PRN Luci L Zuleika, CRNP   aluminum-magnesium hydroxide-simethicone 30 mL Oral Q4H PRN Luci L Zuleika, CRNP   benztropine 1 mg Intramuscular Q6H PRN Luci L Zuleika, CRNP   benztropine 1 mg Oral Q6H PRN Luci L Zuleika, CRNP   folic acid 1 mg Oral Daily Batsheva Castro MD   gabapentin 300 mg Oral TID Alannah Renner MD   haloperidol 5 mg Oral Q6H PRN Luci L Zuleika, CRNP   haloperidol lactate 5 mg Intramuscular Q6H PRN Luci L Zuleika, CRNP   hydrOXYzine HCL 25 mg Oral Q6H PRN Luci L Zuleika, CRNP   LORazepam 1 mg Intramuscular Q6H PRN Luci L Zuleika, CRNP   LORazepam 1 mg Oral Q6H PRN Luci L Zuleika, CRNP   magnesium hydroxide 30 mL Oral Daily PRN Luci L Zuleika, CRNP   nicotine 1 patch Transdermal Daily Salazar Rawls MD   nicotine polacrilex 2 mg Oral Q2H PRN Salazar Rawls MD   OLANZapine 10 mg Intramuscular Q6H PRN Luci L Zuleika, CRNP   OLANZapine 10 mg Oral Q6H PRN Luci L Zuleika, CRNP   QUEtiapine 100 mg Oral HS Alannah Renner MD   risperiDONE 2 mg Oral Q3H PRN Luci L Zuleika, CRNP   sertraline 225 mg Oral Daily Salazar Rawls MD   thiamine 100 mg Oral Daily Batsheva Castro MD   traZODone 50 mg Oral HS PRN Luci INIGUEZ Zuleika, CRNP       Risks / Benefits of Treatment:    Risks, benefits, and possible side effects of medications explained to patient and patient verbalizes understanding and agreement for treatment  Counseling / Coordination of Care:    Patient's progress reviewed with nursing staff  Medications, treatment progress and treatment plan reviewed with patient  Medication changes discussed with patient      Emani Rodriguez MD 11/17/19

## 2019-11-17 NOTE — PLAN OF CARE
Problem: Risk for Self Injury/Neglect  Goal: Treatment Goal: Remain safe during length of stay, learn and adopt new coping skills, and be free of self-injurious ideation, impulses and acts at the time of discharge  Outcome: Progressing  Goal: Verbalize thoughts and feelings  Description  Interventions:  - Assess and re-assess patient's lethality and potential for self-injury  - Engage patient in 1:1 interactions, daily, for a minimum of 15 minutes  - Encourage patient to express feelings, fears, frustrations, hopes  - Establish rapport/trust with patient   Outcome: Progressing  Goal: Attend and participate in unit activities, including therapeutic, recreational, and educational groups  Description  Interventions:  - Provide therapeutic and educational activities daily, encourage attendance and participation, and document same in the medical record  - Obtain collateral information, encourage visitation and family involvement in care   Outcome: Progressing     Problem: SUBSTANCE USE/ABUSE  Goal: By discharge, will develop insight into their chemical dependency and sustain motivation to continue in recovery  Description  INTERVENTIONS:  - Attends all daily group sessions and scheduled AA groups  - Actively practices coping skills through participation in the therapeutic community and adherence to program rules  - Reviews and completes assignments from individual treatment plan  - Assist patient development of understanding of their personal cycle of addiction and relapse triggers  Outcome: Progressing  Goal: By discharge, patient will have ongoing treatment plan addressing chemical dependency  Description  INTERVENTIONS:  - Assist patient with resources and/or appointments for ongoing recovery based living  Outcome: Progressing     Problem: SAFETY, RESTRAINT - VIOLENT/SELF-DESTRUCTIVE  Goal: Remains free of harm/injury from restraints (Restraint for Violent/Self-Destructive Behavior)  Description  INTERVENTIONS:  - Instruct patient/family regarding restraint use   - Assess and monitor physiologic and psychological status   - Provide interventions and comfort measures to meet assessed patient needs   - Ensure continuous in person monitoring is provided   - Identify and implement measures to help patient regain control  - Assess readiness for release of restraint  Outcome: Progressing  Goal: Returns to optimal restraint-free functioning  Description  INTERVENTIONS:  - Assess the patient's behavior and symptoms that indicate continued need for restraint  - Identify and implement measures to help patient regain control  - Assess readiness for release of restraint   Outcome: Progressing     Problem: DISCHARGE PLANNING  Goal: Discharge to home or other facility with appropriate resources  Description  INTERVENTIONS:  - Identify barriers to discharge w/patient and caregiver  - Arrange for needed discharge resources and transportation as appropriate  - Identify discharge learning needs (meds, wound care, etc )  - Arrange for interpretive services to assist at discharge as needed  - Refer to Case Management Department for coordinating discharge planning if the patient needs post-hospital services based on physician/advanced practitioner order or complex needs related to functional status, cognitive ability, or social support system  Outcome: Progressing     Problem: Ineffective Coping  Goal: Participates in unit activities  Description  Interventions:  - Provide therapeutic environment   - Provide required programming   - Redirect inappropriate behaviors   Outcome: Progressing

## 2019-11-17 NOTE — PROGRESS NOTES
Patient denies all symptoms  He has been cooperative and pleasant  He denies any needs  Pt is visible in the milieu

## 2019-11-18 PROBLEM — F10.94 ALCOHOL USE WITH ALCOHOL-INDUCED MOOD DISORDER (HCC): Status: ACTIVE | Noted: 2019-11-18

## 2019-11-18 PROBLEM — F39 MOOD DISORDER (HCC): Status: ACTIVE | Noted: 2019-11-15

## 2019-11-18 LAB — 1,25(OH)2D3 SERPL-MCNC: 32.4 PG/ML (ref 19.9–79.3)

## 2019-11-18 PROCEDURE — 99232 SBSQ HOSP IP/OBS MODERATE 35: CPT | Performed by: NURSE PRACTITIONER

## 2019-11-18 RX ADMIN — GABAPENTIN 300 MG: 300 CAPSULE ORAL at 21:23

## 2019-11-18 RX ADMIN — NICOTINE POLACRILEX 2 MG: 2 GUM, CHEWING ORAL at 08:50

## 2019-11-18 RX ADMIN — FOLIC ACID 1 MG: 1 TABLET ORAL at 08:14

## 2019-11-18 RX ADMIN — GABAPENTIN 300 MG: 300 CAPSULE ORAL at 16:00

## 2019-11-18 RX ADMIN — GABAPENTIN 300 MG: 300 CAPSULE ORAL at 08:13

## 2019-11-18 RX ADMIN — NICOTINE POLACRILEX 2 MG: 2 GUM, CHEWING ORAL at 17:24

## 2019-11-18 RX ADMIN — NICOTINE POLACRILEX 2 MG: 2 GUM, CHEWING ORAL at 13:05

## 2019-11-18 RX ADMIN — NICOTINE 1 PATCH: 21 PATCH, EXTENDED RELEASE TRANSDERMAL at 08:14

## 2019-11-18 RX ADMIN — THIAMINE HCL TAB 100 MG 100 MG: 100 TAB at 08:14

## 2019-11-18 RX ADMIN — SERTRALINE HYDROCHLORIDE 225 MG: 50 TABLET ORAL at 08:13

## 2019-11-18 RX ADMIN — QUETIAPINE FUMARATE 100 MG: 100 TABLET ORAL at 22:10

## 2019-11-18 NOTE — CASE MANAGEMENT
Pt doing better  Signed 72 hr notice on 11/16 @ 2843  To be D/C tomorrow  Appt scheduled @ [facility] in [place], [place], with Dr Cesar Poster  [facility] [place] [place]  Called pt's POA/caregiver, Tiago Geller, [phone]  Left message @ 12 P, advising that pt to be D/C tomorrow  Asked if he could pick pt up @ D/C

## 2019-11-18 NOTE — PROGRESS NOTES
Pt denies all symptoms  He is polite and friendly  He states "I'm doing excellent " Pt is focused on discharge and feeling prepared for same  72 hour notice was signed on 11/16  Pt is hopeful for d/c early this week

## 2019-11-18 NOTE — CASE MANAGEMENT
Rec'd call from [employer], pt's POA  Advised him that pt was doing well, & would be D/C tomorrow  Asked if he could pick pt up  POA said yes  He is coming from [place]  Said he'd be able to pick pt up @ 12 PM tomorrow  Said he'd try to call pt tonight

## 2019-11-18 NOTE — PROGRESS NOTES
11/18/19 0700   Team Meeting   Meeting Type Daily Rounds   Team Members Present   Team Members Present Physician;Nurse;; Other (Discipline and Name)   Physician Team Member Dejuan Thomas NP   Nursing Team Member [facility]   Social Work Team Member Juan Loza   Patient/Family Present   Patient Present No   Patient's Family Present No     Possible d/c tomorrow

## 2019-11-18 NOTE — PLAN OF CARE
Problem: SUBSTANCE USE/ABUSE  Goal: By discharge, will develop insight into their chemical dependency and sustain motivation to continue in recovery  Description  INTERVENTIONS:  - Attends all daily group sessions and scheduled AA groups  - Actively practices coping skills through participation in the therapeutic community and adherence to program rules  - Reviews and completes assignments from individual treatment plan  - Assist patient development of understanding of their personal cycle of addiction and relapse triggers  Outcome: Progressing  Goal: By discharge, patient will have ongoing treatment plan addressing chemical dependency  Description  INTERVENTIONS:  - Assist patient with resources and/or appointments for ongoing recovery based living  Outcome: Progressing     Problem: DISCHARGE PLANNING  Goal: Discharge to home or other facility with appropriate resources  Description  INTERVENTIONS:  - Identify barriers to discharge w/patient and caregiver  - Arrange for needed discharge resources and transportation as appropriate  - Identify discharge learning needs (meds, wound care, etc )  - Arrange for interpretive services to assist at discharge as needed  - Refer to Case Management Department for coordinating discharge planning if the patient needs post-hospital services based on physician/advanced practitioner order or complex needs related to functional status, cognitive ability, or social support system  Outcome: Progressing     Problem: Ineffective Coping  Goal: Participates in unit activities  Description  Interventions:  - Provide therapeutic environment   - Provide required programming   - Redirect inappropriate behaviors   Outcome: Progressing

## 2019-11-18 NOTE — PROGRESS NOTES
Progress Note - Behavioral Health   Daniela Camacho 46 y o  male MRN: [id number]  Unit/Bed#: [facility] 446-34 Encounter: [id number]    Assessment/Plan   Principal Problem:    Major depressive disorder, recurrent, severe without psychotic features ([facility] )  Active Problems:    Medical clearance for psychiatric admission    Transaminitis    Tobacco abuse    Mixed hyperlipidemia    Post-traumatic stress disorder, chronic    Uncomplicated alcohol dependence ([facility] )  Patient was seen for continuing care and treatment  Case was discussed with the multi-disciplinary treatment team   On examination today, the patient is pleasant cooperative  Does have a 72 hour notice and affect  72 hour notice expires tomorrow  He tells us today that he is feeling much better from admission  He is no longer experiencing any suicidal homicidal ideation  Not psychotic  He is less depressed  He feels that the increase of Zoloft was quite helpful in decreasing his depression and relieving the symptoms that brought him into the hospital   He will have follow-up care and treatment with the [place] system in the [place] area  I reviewed his case with the covering physician to discuss tomorrow's discharge  No new clinical issues or concerns are noted  Discharge planning and disposition is ongoing        Behavior over the last 24 hours:  improved  Sleep: normal  Appetite: normal  Medication side effects: No  ROS: no complaints    Mental Status Evaluation:  Appearance:  casually dressed   Behavior:  normal   Speech:  normal volume   Mood:  normal   Affect:  normal   Thought Process:  normal   Thought Content:  normal   Perceptual Disturbances: None   Risk Potential: None   Sensorium:  person, place and time/date   Cognition:  grossly intact   Consciousness:  alert and awake    Attention: attention span and concentration were age appropriate   Insight:  fair   Judgment: fair   Gait/Station: normal gait/station   Motor Activity: no abnormal movements Progress Toward Goals: Improved mood    Recommended Treatment: Continue with group therapy, milieu therapy and occupational therapy  Risks, benefits and possible side effects of Medications:   Risks, benefits, and possible side effects of medications explained to patient and patient verbalizes understanding  Medications: continue current psychiatric medications  Labs: Reviewed    Counseling / Coordination of Care  Total floor / unit time spent today 25 minutes  Greater than 50% of total time was spent with the patient and / or family counseling and / or coordination of care   A description of the counseling / coordination of care: 15

## 2019-11-18 NOTE — PROGRESS NOTES
Client observed positively interacting with other clients in the milieu  Client reports feeling very good today  Client denies any symptoms  No SI, HI, A/V hallucinations  Client encouraged

## 2019-11-18 NOTE — PROGRESS NOTES
Pt presents as visible in public areas as well as bedroom  Currently denies SI, HI and A/V hallucinations  Compliant with meds  Observed as cooperative  Reports no significant changes since last assessment

## 2019-11-19 VITALS
BODY MASS INDEX: 29.55 KG/M2 | DIASTOLIC BLOOD PRESSURE: 85 MMHG | RESPIRATION RATE: 16 BRPM | TEMPERATURE: 98 F | HEIGHT: 73 IN | OXYGEN SATURATION: 96 % | WEIGHT: 223 LBS | SYSTOLIC BLOOD PRESSURE: 131 MMHG | HEART RATE: 76 BPM

## 2019-11-19 PROCEDURE — 99238 HOSP IP/OBS DSCHRG MGMT 30/<: CPT | Performed by: NURSE PRACTITIONER

## 2019-11-19 RX ORDER — GABAPENTIN 300 MG/1
300 CAPSULE ORAL 3 TIMES DAILY
Qty: 90 CAPSULE | Refills: 0 | Status: SHIPPED | OUTPATIENT
Start: 2019-11-19 | End: 2019-12-19

## 2019-11-19 RX ORDER — QUETIAPINE FUMARATE 100 MG/1
100 TABLET, FILM COATED ORAL
Qty: 30 TABLET | Refills: 0 | Status: SHIPPED | OUTPATIENT
Start: 2019-11-19 | End: 2019-12-19

## 2019-11-19 RX ORDER — SERTRALINE HYDROCHLORIDE 25 MG/1
225 TABLET, FILM COATED ORAL DAILY
Qty: 270 TABLET | Refills: 0 | Status: SHIPPED | OUTPATIENT
Start: 2019-11-20 | End: 2019-12-20

## 2019-11-19 RX ADMIN — NICOTINE 1 PATCH: 21 PATCH, EXTENDED RELEASE TRANSDERMAL at 08:28

## 2019-11-19 RX ADMIN — FOLIC ACID 1 MG: 1 TABLET ORAL at 08:28

## 2019-11-19 RX ADMIN — NICOTINE POLACRILEX 2 MG: 2 GUM, CHEWING ORAL at 11:33

## 2019-11-19 RX ADMIN — NICOTINE POLACRILEX 2 MG: 2 GUM, CHEWING ORAL at 08:38

## 2019-11-19 RX ADMIN — THIAMINE HCL TAB 100 MG 100 MG: 100 TAB at 08:28

## 2019-11-19 RX ADMIN — GABAPENTIN 300 MG: 300 CAPSULE ORAL at 08:28

## 2019-11-19 RX ADMIN — SERTRALINE HYDROCHLORIDE 225 MG: 50 TABLET ORAL at 08:28

## 2019-11-19 NOTE — PROGRESS NOTES
Met with patient to complete his relapse prevention form and discussed that he will follow up with the [place] about his relapsed  Shared this information with his

## 2019-11-19 NOTE — PROGRESS NOTES
11/19/19 0700   Team Meeting   Meeting Type Daily Rounds   Team Members Present   Team Members Present Physician;Nurse;; Other (Discipline and Name)   Nursing Team Member Thomas   Social Work Team Member Juana Dsouza   Patient/Family Present   Patient Present No   Patient's Family Present No   Discharge at noon

## 2019-11-19 NOTE — BH TRANSITION RECORD
Contact Information: If you have any questions, concerns, pended studies, tests and/or procedures, or emergencies regarding your inpatient behavioral health visit  Please contact Please contact [facility] [phone] and ask to speak to a , nurse or physician  A contact is available 24 hours/ 7 days a week at this number  Summary of Procedures Performed During your Stay:  Below is a list of major procedures performed during your hospital stay and a summary of results:  - No major procedures performed  Pending Studies (From admission, onward)      None        If studies are pending at discharge, follow up with your PCP and/or referring provider

## 2019-11-19 NOTE — DISCHARGE INSTRUCTIONS
Abuse of Alcohol   WHAT YOU NEED TO KNOW:   · Alcohol abuse is unhealthy drinking behavior  You may drink too much at one time once a week, or continue to drink too much daily  You continue to drink even though it causes problems  The problems can be alcohol related legal problems, or problems with work or relationships with family  · If you drink too much at one time, you are binge drinking  Binge drinking is when you have a large amount of alcohol in a short time  Your blood alcohol concentrations (SUPRIYA) goes above 0 08 g/dLlevel during binge drinking  For men, this usually happens with more than 4 drinks in 2 hours  For women, it is more than 3 drinks in 2 hours  A drink is 12 ounces of beer, 4 ounces of wine, or 1½ ounces of liquor  DISCHARGE INSTRUCTIONS:   Call 911 for the following:   · You have sudden chest pain or trouble breathing  · You have a seizure or have shaking or trembling  · You were in an accident because of alcohol  Seek care immediately if:   · You want to harm yourself or others  · You have hallucinations (you see or hear things that are not real)  · You cannot stop vomiting or you vomit blood  Contact your healthcare provider if:   · You need help to stop drinking alcohol  · You have questions or concerns about your condition or care  Medicines:   · Vitamin supplements  may be given to treat low vitamin levels  Alcohol can make it hard for your body to absorb enough vitamins such as B1  Vitamin supplements may also be given to prevent alcohol related brain damage  · Take your medicine as directed  Contact your healthcare provider if you think your medicine is not helping or if you have side effects  Tell him or her if you are allergic to any medicine  Keep a list of the medicines, vitamins, and herbs you take  Include the amounts, and when and why you take them  Bring the list or the pill bottles to follow-up visits   Carry your medicine list with you in case of an emergency  Treatments or therapies you may need:   · Detoxification (detox) and withdrawal  is a program that helps you to safely get alcohol out of your body  Detox can also help get rid of the physical need to drink  Healthcare providers monitor the physical symptoms of withdrawal  They may give you medicines to help decrease nausea, dehydration, and seizures  Healthcare providers will also monitor your blood pressure, heart and breathing rates, and your temperature  Symptoms of anxiety, depression, and suicidal thoughts are also monitored and managed during detox  Healthcare providers may give you medicines for these symptoms and therapy sessions will be available to you  Detox is usually done at a detox center or in a hospital  Healthcare providers do not recommend that you try to detox at home or by yourself  Withdrawal symptoms may become life-threatening  The center can help you find 12 step programs or an individual therapist to help with emotional support after detox  · Inpatient and outpatient treatment  focus on your personal needs to help you stop drinking  Treatment helps you understand the reasons you abuse alcohol  Counselors and therapists provide you with support and help you find ways to cope instead of drinking  You may need inpatient treatment to provide a controlled environment  You may need outpatient treatment after your inpatient treatment is complete  · Alcohol aversion therapy  takes away the desire to drink by causing a negative reaction when you drink  Healthcare providers may give you medicines that cause nausea and vomiting when you drink alcohol  They may instead give you a medicine that decreases your urge to drink alcohol  These medicines are used to help you stop drinking or reduce the amount you drink  They can also help you avoid relapse  Follow up with your healthcare provider as directed:  Write down your questions so you remember to ask them during your visits    Avoid alcohol:  You should stop drinking entirely  Alcohol can damage your brain, heart, and liver  It also increases your risk for injury, high blood pressure, and certain types of cancer  Alcohol is dangerous when you combine it with certain medicines  Do not drive if you drink alcohol:  Make sure someone who has not been drinking can help you get home  Get support:  Most people need support to stop drinking alcohol  Mental health providers, support groups, rehabilitation centers, and your healthcare provider can provide support  For more information:   · Alcoholics Anonymous  Web Address: http://www [employer]/  · Substance Abuse and [facility] , [address]  Web Address: https://too.me/  © 2017 [address] [employer] Information is for End User's use only and may not be sold, redistributed or otherwise used for commercial purposes  All illustrations and images included in CareNotes® are the copyrighted property of [employer]  or Alvaro Bullock  The above information is an  only  It is not intended as medical advice for individual conditions or treatments  Talk to your doctor, nurse or pharmacist before following any medical regimen to see if it is safe and effective for you

## 2019-11-19 NOTE — PLAN OF CARE
Problem: SUBSTANCE USE/ABUSE  Goal: By discharge, will develop insight into their chemical dependency and sustain motivation to continue in recovery  Description  INTERVENTIONS:  - Attends all daily group sessions and scheduled AA groups  - Actively practices coping skills through participation in the therapeutic community and adherence to program rules  - Reviews and completes assignments from individual treatment plan  - Assist patient development of understanding of their personal cycle of addiction and relapse triggers  Outcome: Completed  Goal: By discharge, patient will have ongoing treatment plan addressing chemical dependency  Description  INTERVENTIONS:  - Assist patient with resources and/or appointments for ongoing recovery based living  Outcome: Completed     Problem: DISCHARGE PLANNING  Goal: Discharge to home or other facility with appropriate resources  Description  INTERVENTIONS:  - Identify barriers to discharge w/patient and caregiver  - Arrange for needed discharge resources and transportation as appropriate  - Identify discharge learning needs (meds, wound care, etc )  - Arrange for interpretive services to assist at discharge as needed  - Refer to Case Management Department for coordinating discharge planning if the patient needs post-hospital services based on physician/advanced practitioner order or complex needs related to functional status, cognitive ability, or social support system  Outcome: Completed     Problem: Ineffective Coping  Goal: Participates in unit activities  Description  Interventions:  - Provide therapeutic environment   - Provide required programming   - Redirect inappropriate behaviors   Outcome: Completed

## 2019-11-19 NOTE — NURSING NOTE
Pt demonstrated clear understanding of medication education and outpt appts, states he will go to all of his outpt appts, start therapy again, do addictions [facility] and take his medications as ordered  Pt has a bright affect, denies any ideations of self harm, no SI, no HI, appears appropriate and insightful upon interaction  Pt is transported home by his cousin and caregiver Danita Kamara

## 2019-11-19 NOTE — DISCHARGE SUMMARY
Discharge Summary - 179-00 Yamil June 46 y o  male MRN: [id number]  Unit/Bed#: [facility] Encounter: [id number]     Admission Date:   Admission Orders (From admission, onward)     Ordered        11/14/19 1745  DISCHARGE READMIT Admit Patient to [address] [place] [address] (use with Discharge Readmit Navigator in [facility] Discharge Readmit scenario including from any IP unit or different campus ED to [facility])  Nurse to release order when pt  arrives to [facility]  Once                         Discharge Date: No discharge date for patient encounter  Attending Psychiatrist: Arleen Jaramillo MD    Reason for Admission:   Sara Nava is a 46 y o  male who presents with Signs of suicidal potential and Severe agitation  Sara Nava reported relapsing on alcohol after being sober for 2 years  He reported recurrent nightmares, frequent nighttime awakenings, severe anxiety, and depression  he was admitted to the psychiatric unit on a voluntarily 201 commitment basis  Please see initial H&P for full details  Hospital Course: On admission, Sara Nava was started on Zoloft 225 mg daily and Seroquel 50 mg q h s , as well as Neurontin 300 mg p o  T i d  his medications were titrated as appropriate, including increasing his Seroquel to 100 mg nightly  he was also started on p r n  Trazodone for sleep but only use this medication on his 1st night of admission  he tolerated these medications with no acute side effects  The patient's mood brightened over the course of treatment, and he was seen in [facility] interacting appropriately with peers  Sara Nava did not demonstrate dangerous behavior to self or others during his inpatient stay  Sara Nava denied suicidal or homicidal ideations at the time of his discharge  Labs/Imaging:   I have personally reviewed all pertinent laboratory/tests results      Mental Status Evaluation:  Appearance:  alert, casually dressed, appears stated age and appropriate grooming and hygiene   Behavior: pleasant, cooperative, sitting comfortably, good eye contact, no abnormal movements and normal gait and balance   Speech:  spontaneous, clear, normal rate, normal volume and coherent   Mood:  euthymic   Affect:  mood-congruent and appropriate range   Thought Process:  organized, logical, coherent, linear, goal directed, normal rate of thoughts   Thought Content: no verbalized delusions, no overt paranoia, no obsessive thinking   Perceptual disturbances: no reported auditory hallucinations, no reported visual hallucinations and does not appear to be responding to internal stimuli at this time   Risk Potential: No active or passive suicidal or homicidal ideation   Cognition: oriented to person, place, time, and situation, memory grossly intact, appears to be of average intelligence, normal abstract reasoning and age-appropriate attention span and concentration   Insight:  Fair   Judgment: Good     Discharge Diagnosis:   Principal Problem:    Mood disorder ([facility] )  Active Problems:    Medical clearance for psychiatric admission    Transaminitis    Tobacco abuse    Mixed hyperlipidemia    Post-traumatic stress disorder, chronic    Uncomplicated alcohol dependence ([facility] )    Alcohol use with alcohol-induced mood disorder ([place] [id number] )      Discharge Medications:  See list above, as well as the after visit summary containing reconciled discharge medications provided to patient and family  Discharge instructions/Information to patient and family:   See after visit summary for information provided to patient and family  Provisions for Follow-Up Care:  See after visit summary for information related to follow-up care and any pertinent home health orders  This note has been constructed using a voice recognition system  There may be translation, syntax, or grammatical errors  If you have any questions, please contact the dictating provider

## 2019-11-19 NOTE — PROGRESS NOTES
Observed client out in dayroom sitting and interacting positively with another client  Client is pleasant, cooperative and acts appropriately for situation  Client states that he didn't sleep well, but most likely due to being anxious about discharge today  Client denies any symptoms  No SI, HI, A/V hallucinations, or any other complaints  Client is happy about going home and sts that he intends to seek further behavorial treatment from the [place]

## 2019-11-19 NOTE — DISCHARGE INSTR - LAB
Contact Information: If you have any questions, concerns, pended studies, tests and/or procedures, or emergencies regarding your inpatient behavioral health visit  Please contact [facility] [phone]  and ask to speak to a , nurse or physician  A contact is available 24 hours/ 7 days a week at this number  Summary of Procedures Performed During your Stay:  Below is a list of major procedures performed during your hospital stay and a summary of results:  - No major procedures performed  Pending Studies (From admission, onward)    None        If studies are pending at discharge, follow up with your PCP and/or referring provider

## 2023-04-09 NOTE — ED NOTES
Pt was changed into hospital scrubs, pt is currently eating breakfast       Pura Story  11/14/19 0958 English